# Patient Record
Sex: FEMALE
[De-identification: names, ages, dates, MRNs, and addresses within clinical notes are randomized per-mention and may not be internally consistent; named-entity substitution may affect disease eponyms.]

---

## 2020-08-26 ENCOUNTER — TELEPHONE (OUTPATIENT)
Dept: SCHEDULING | Age: 65
End: 2020-08-26

## 2022-02-23 ENCOUNTER — MED REC SCAN ONLY (OUTPATIENT)
Dept: PHYSICAL MEDICINE AND REHAB | Facility: CLINIC | Age: 67
End: 2022-02-23

## 2022-02-23 ENCOUNTER — OFFICE VISIT (OUTPATIENT)
Dept: PHYSICAL MEDICINE AND REHAB | Facility: CLINIC | Age: 67
End: 2022-02-23
Payer: MEDICARE

## 2022-02-23 VITALS — BODY MASS INDEX: 32.18 KG/M2 | HEIGHT: 67 IN | WEIGHT: 205 LBS

## 2022-02-23 DIAGNOSIS — R29.898 HAND WEAKNESS: ICD-10-CM

## 2022-02-23 DIAGNOSIS — M54.12 CERVICAL RADICULOPATHY: ICD-10-CM

## 2022-02-23 DIAGNOSIS — G89.29 CHRONIC BILATERAL LOW BACK PAIN WITH RIGHT-SIDED SCIATICA: Primary | ICD-10-CM

## 2022-02-23 DIAGNOSIS — M54.16 LUMBAR RADICULOPATHY: ICD-10-CM

## 2022-02-23 DIAGNOSIS — M43.16 SPONDYLOLISTHESIS OF LUMBAR REGION: ICD-10-CM

## 2022-02-23 DIAGNOSIS — M54.41 CHRONIC BILATERAL LOW BACK PAIN WITH RIGHT-SIDED SCIATICA: Primary | ICD-10-CM

## 2022-02-23 DIAGNOSIS — M51.9 LUMBAR DISC DISEASE: ICD-10-CM

## 2022-02-23 DIAGNOSIS — E11.42 DIABETIC POLYNEUROPATHY ASSOCIATED WITH TYPE 2 DIABETES MELLITUS (HCC): ICD-10-CM

## 2022-02-23 DIAGNOSIS — M48.062 SPINAL STENOSIS OF LUMBAR REGION WITH NEUROGENIC CLAUDICATION: ICD-10-CM

## 2022-02-23 DIAGNOSIS — M48.061 LUMBAR FORAMINAL STENOSIS: ICD-10-CM

## 2022-02-23 PROCEDURE — 99205 OFFICE O/P NEW HI 60 MIN: CPT | Performed by: PHYSICAL MEDICINE & REHABILITATION

## 2022-02-23 RX ORDER — GLYBURIDE 5 MG/1
TABLET ORAL
COMMUNITY
Start: 2010-01-01

## 2022-02-23 RX ORDER — HYDROCHLOROTHIAZIDE 25 MG/1
TABLET ORAL
COMMUNITY
Start: 2021-10-31

## 2022-02-23 RX ORDER — VALSARTAN 320 MG/1
TABLET ORAL
COMMUNITY
Start: 2010-01-01

## 2022-02-23 RX ORDER — EZETIMIBE 10 MG/1
TABLET ORAL
COMMUNITY
Start: 2015-01-05

## 2022-02-23 NOTE — PATIENT INSTRUCTIONS
Plan  I will do an EMG/NCS of the bilateral arms and hands. After she has had the above, I will decide if she need to have a cervical MRI scan or more PT. She will proceed with the medial branch blocks as per her decision. She will try PT with Mary Posey for 4 sessions. She will follow up in 2 months or sooner if needed. Dr. Dilshad Umanzor Dr. Trejo

## 2022-03-29 ENCOUNTER — PROCEDURE VISIT (OUTPATIENT)
Dept: PHYSICAL MEDICINE AND REHAB | Facility: CLINIC | Age: 67
End: 2022-03-29
Payer: MEDICARE

## 2022-03-29 DIAGNOSIS — M54.12 CERVICAL RADICULOPATHY: Primary | ICD-10-CM

## 2022-03-29 DIAGNOSIS — G56.03 BILATERAL CARPAL TUNNEL SYNDROME: ICD-10-CM

## 2022-03-29 DIAGNOSIS — G56.23 ULNAR NEUROPATHY OF BOTH UPPER EXTREMITIES: ICD-10-CM

## 2022-03-29 PROCEDURE — 95886 MUSC TEST DONE W/N TEST COMP: CPT | Performed by: PHYSICAL MEDICINE & REHABILITATION

## 2022-03-29 PROCEDURE — 95911 NRV CNDJ TEST 9-10 STUDIES: CPT | Performed by: PHYSICAL MEDICINE & REHABILITATION

## 2022-04-19 PROBLEM — G56.03 BILATERAL CARPAL TUNNEL SYNDROME: Status: ACTIVE | Noted: 2022-04-19

## 2022-04-19 PROBLEM — G56.23 ULNAR NEUROPATHY OF BOTH UPPER EXTREMITIES: Status: ACTIVE | Noted: 2022-04-19

## 2022-04-25 ENCOUNTER — OFFICE VISIT (OUTPATIENT)
Dept: PHYSICAL THERAPY | Facility: HOSPITAL | Age: 67
End: 2022-04-25
Attending: PHYSICAL MEDICINE & REHABILITATION
Payer: MEDICARE

## 2022-04-25 DIAGNOSIS — M54.16 LUMBAR RADICULOPATHY: ICD-10-CM

## 2022-04-25 DIAGNOSIS — E11.42 DIABETIC POLYNEUROPATHY ASSOCIATED WITH TYPE 2 DIABETES MELLITUS (HCC): ICD-10-CM

## 2022-04-25 DIAGNOSIS — G89.29 CHRONIC BILATERAL LOW BACK PAIN WITH RIGHT-SIDED SCIATICA: ICD-10-CM

## 2022-04-25 DIAGNOSIS — M48.062 SPINAL STENOSIS OF LUMBAR REGION WITH NEUROGENIC CLAUDICATION: ICD-10-CM

## 2022-04-25 DIAGNOSIS — M51.9 LUMBAR DISC DISEASE: ICD-10-CM

## 2022-04-25 DIAGNOSIS — M54.41 CHRONIC BILATERAL LOW BACK PAIN WITH RIGHT-SIDED SCIATICA: ICD-10-CM

## 2022-04-25 DIAGNOSIS — M48.061 LUMBAR FORAMINAL STENOSIS: ICD-10-CM

## 2022-04-25 DIAGNOSIS — M43.16 SPONDYLOLISTHESIS OF LUMBAR REGION: ICD-10-CM

## 2022-04-25 PROCEDURE — 97161 PT EVAL LOW COMPLEX 20 MIN: CPT | Performed by: PHYSICAL THERAPIST

## 2022-04-25 PROCEDURE — 97110 THERAPEUTIC EXERCISES: CPT | Performed by: PHYSICAL THERAPIST

## 2022-04-28 ENCOUNTER — OFFICE VISIT (OUTPATIENT)
Dept: PHYSICAL THERAPY | Facility: HOSPITAL | Age: 67
End: 2022-04-28
Attending: PHYSICAL MEDICINE & REHABILITATION
Payer: MEDICARE

## 2022-04-28 PROCEDURE — 97112 NEUROMUSCULAR REEDUCATION: CPT | Performed by: PHYSICAL THERAPIST

## 2022-05-02 ENCOUNTER — OFFICE VISIT (OUTPATIENT)
Dept: PHYSICAL THERAPY | Facility: HOSPITAL | Age: 67
End: 2022-05-02
Attending: PHYSICAL MEDICINE & REHABILITATION
Payer: MEDICARE

## 2022-05-02 PROCEDURE — 97112 NEUROMUSCULAR REEDUCATION: CPT | Performed by: PHYSICAL THERAPIST

## 2022-05-05 ENCOUNTER — APPOINTMENT (OUTPATIENT)
Dept: PHYSICAL THERAPY | Facility: HOSPITAL | Age: 67
End: 2022-05-05
Attending: PHYSICAL MEDICINE & REHABILITATION
Payer: MEDICARE

## 2022-05-09 ENCOUNTER — OFFICE VISIT (OUTPATIENT)
Dept: PHYSICAL MEDICINE AND REHAB | Facility: CLINIC | Age: 67
End: 2022-05-09
Payer: MEDICARE

## 2022-05-09 ENCOUNTER — OFFICE VISIT (OUTPATIENT)
Dept: PHYSICAL THERAPY | Facility: HOSPITAL | Age: 67
End: 2022-05-09
Attending: PHYSICAL MEDICINE & REHABILITATION
Payer: MEDICARE

## 2022-05-09 ENCOUNTER — TELEPHONE (OUTPATIENT)
Dept: NEUROLOGY | Facility: CLINIC | Age: 67
End: 2022-05-09

## 2022-05-09 VITALS
HEIGHT: 67 IN | DIASTOLIC BLOOD PRESSURE: 96 MMHG | SYSTOLIC BLOOD PRESSURE: 142 MMHG | OXYGEN SATURATION: 98 % | WEIGHT: 200 LBS | BODY MASS INDEX: 31.39 KG/M2 | HEART RATE: 71 BPM | RESPIRATION RATE: 16 BRPM

## 2022-05-09 DIAGNOSIS — G89.29 CHRONIC BILATERAL LOW BACK PAIN WITH RIGHT-SIDED SCIATICA: ICD-10-CM

## 2022-05-09 DIAGNOSIS — M54.16 LUMBAR RADICULOPATHY: ICD-10-CM

## 2022-05-09 DIAGNOSIS — M43.16 SPONDYLOLISTHESIS OF LUMBAR REGION: ICD-10-CM

## 2022-05-09 DIAGNOSIS — M51.9 LUMBAR DISC DISEASE: ICD-10-CM

## 2022-05-09 DIAGNOSIS — R29.898 HAND WEAKNESS: ICD-10-CM

## 2022-05-09 DIAGNOSIS — M54.41 CHRONIC BILATERAL LOW BACK PAIN WITH RIGHT-SIDED SCIATICA: ICD-10-CM

## 2022-05-09 DIAGNOSIS — M48.061 LUMBAR FORAMINAL STENOSIS: ICD-10-CM

## 2022-05-09 DIAGNOSIS — M48.062 SPINAL STENOSIS OF LUMBAR REGION WITH NEUROGENIC CLAUDICATION: ICD-10-CM

## 2022-05-09 DIAGNOSIS — E11.42 DIABETIC POLYNEUROPATHY ASSOCIATED WITH TYPE 2 DIABETES MELLITUS (HCC): ICD-10-CM

## 2022-05-09 DIAGNOSIS — M54.12 CERVICAL RADICULOPATHY: ICD-10-CM

## 2022-05-09 DIAGNOSIS — G56.03 BILATERAL CARPAL TUNNEL SYNDROME: ICD-10-CM

## 2022-05-09 DIAGNOSIS — G56.23 ULNAR NEUROPATHY OF BOTH UPPER EXTREMITIES: Primary | ICD-10-CM

## 2022-05-09 PROCEDURE — 97112 NEUROMUSCULAR REEDUCATION: CPT | Performed by: PHYSICAL THERAPIST

## 2022-05-09 PROCEDURE — 99214 OFFICE O/P EST MOD 30 MIN: CPT | Performed by: PHYSICAL MEDICINE & REHABILITATION

## 2022-05-09 RX ORDER — PIOGLITAZONEHYDROCHLORIDE 30 MG/1
30 TABLET ORAL DAILY
COMMUNITY
Start: 2022-04-26

## 2022-05-09 RX ORDER — BLOOD SUGAR DIAGNOSTIC
1 STRIP MISCELLANEOUS 2 TIMES DAILY
COMMUNITY
Start: 2022-02-28

## 2022-05-09 RX ORDER — AMOXICILLIN AND CLAVULANATE POTASSIUM 500; 125 MG/1; MG/1
TABLET, FILM COATED ORAL
COMMUNITY

## 2022-05-09 NOTE — PATIENT INSTRUCTIONS
Plan  She will get a MRI of the cervical spine. She will see Dorinda Melendez for the PT today. She will get get 2 surgical opinions about her lumbar spine. The patient does not need any pain medications at this time. She will follow up after seeing the spine surgeons and getting the cervical MRI scan. She will PT for the bilateral ulnar neuropathies.

## 2022-05-09 NOTE — TELEPHONE ENCOUNTER
Per Medicare guidelines authorization is not required for MRI SPINE CERVICAL (CPT=7241). L/m advising of above.

## 2022-06-03 ENCOUNTER — OFFICE VISIT (OUTPATIENT)
Dept: PHYSICAL THERAPY | Facility: HOSPITAL | Age: 67
End: 2022-06-03
Attending: PHYSICAL MEDICINE & REHABILITATION
Payer: MEDICARE

## 2022-06-03 PROCEDURE — 97112 NEUROMUSCULAR REEDUCATION: CPT | Performed by: PHYSICAL THERAPIST

## 2022-06-06 ENCOUNTER — HOSPITAL ENCOUNTER (OUTPATIENT)
Dept: MRI IMAGING | Facility: HOSPITAL | Age: 67
Discharge: HOME | End: 2022-06-06
Attending: PHYSICAL MEDICINE & REHABILITATION
Payer: MEDICARE

## 2022-06-06 DIAGNOSIS — M54.12 CERVICAL RADICULOPATHY: ICD-10-CM

## 2022-06-06 PROCEDURE — 72141 MRI NECK SPINE W/O DYE: CPT | Performed by: PHYSICAL MEDICINE & REHABILITATION

## 2022-06-07 ENCOUNTER — OFFICE VISIT (OUTPATIENT)
Dept: PHYSICAL THERAPY | Facility: HOSPITAL | Age: 67
End: 2022-06-07
Attending: PHYSICAL MEDICINE & REHABILITATION
Payer: MEDICARE

## 2022-06-07 PROCEDURE — 97112 NEUROMUSCULAR REEDUCATION: CPT | Performed by: PHYSICAL THERAPIST

## 2022-06-17 ENCOUNTER — OFFICE VISIT (OUTPATIENT)
Dept: PHYSICAL THERAPY | Facility: HOSPITAL | Age: 67
End: 2022-06-17
Attending: PHYSICAL MEDICINE & REHABILITATION
Payer: MEDICARE

## 2022-06-17 PROCEDURE — 97112 NEUROMUSCULAR REEDUCATION: CPT | Performed by: PHYSICAL THERAPIST

## 2022-06-23 ENCOUNTER — OFFICE VISIT (OUTPATIENT)
Dept: SURGERY | Facility: CLINIC | Age: 67
End: 2022-06-23
Payer: MEDICARE

## 2022-06-23 VITALS — DIASTOLIC BLOOD PRESSURE: 92 MMHG | SYSTOLIC BLOOD PRESSURE: 140 MMHG | HEART RATE: 80 BPM

## 2022-06-23 DIAGNOSIS — M43.16 SPONDYLOLISTHESIS AT L4-L5 LEVEL: ICD-10-CM

## 2022-06-23 DIAGNOSIS — M48.062 LUMBAR STENOSIS WITH NEUROGENIC CLAUDICATION: Primary | ICD-10-CM

## 2022-06-23 PROCEDURE — 99203 OFFICE O/P NEW LOW 30 MIN: CPT | Performed by: NEUROLOGICAL SURGERY

## 2022-06-23 RX ORDER — CLOTRIMAZOLE AND BETAMETHASONE DIPROPIONATE 10; .64 MG/G; MG/G
CREAM TOPICAL
COMMUNITY

## 2022-06-23 RX ORDER — AMOXICILLIN 500 MG/1
CAPSULE ORAL
COMMUNITY

## 2022-06-23 NOTE — PROGRESS NOTES
States she having lower back pain, states more on the right then the left, has n/t in the right leg stop at the knee. Had back pain for years but it started getting worst a year ago. States she went PT - its not helping her. She had the nerve burn on 4.2.22 states it helping her. Had 3 injections- the first on helped her but not the other turn. . when she doing things during the day she can get up to 10/10

## 2022-06-24 ENCOUNTER — OFFICE VISIT (OUTPATIENT)
Dept: PHYSICAL THERAPY | Facility: HOSPITAL | Age: 67
End: 2022-06-24
Attending: PHYSICAL MEDICINE & REHABILITATION
Payer: MEDICARE

## 2022-06-24 PROCEDURE — 97140 MANUAL THERAPY 1/> REGIONS: CPT | Performed by: PHYSICAL THERAPIST

## 2022-07-01 ENCOUNTER — OFFICE VISIT (OUTPATIENT)
Dept: PHYSICAL THERAPY | Facility: HOSPITAL | Age: 67
End: 2022-07-01
Attending: PHYSICAL MEDICINE & REHABILITATION
Payer: MEDICARE

## 2022-07-01 PROCEDURE — 97140 MANUAL THERAPY 1/> REGIONS: CPT | Performed by: PHYSICAL THERAPIST

## 2022-07-01 NOTE — PROGRESS NOTES
2022  Patient Name: Vika Ellis  YOB: 1955          MRN number:  [id number]  Referring Physician:  Dee Rodríguez      Discharge Summary    Dx:      Chronic bilateral low back pain with right-sided sciatica (M54.41,G89.29)  Spinal stenosis of lumbar region with neurogenic claudication (M48.062)  Lumbar disc disease (M51.9)  Lumbar foraminal stenosis (M48.061)  Spondylolisthesis of lumbar region (M43.16)  Diabetic polyneuropathy associated with type 2 diabetes mellitus (HCC) (E11.42)  Lumbar radiculopathy (M54.16)     Authorized # of Visits:  10 visits on POC          Next MD visit: none   Fall Risk: standard         Precautions:  spondylolisthesis at 2 levels  Medication Changes since last visit?: No    Subjective: Reports she is feeing good and ready for discharge. Overall symptoms have decreased and she can tolerate her workouts now. Also notes improved walking tolerance, less LE and UE pain. Has been doing her HEP daily and plans to have surgery on her lumbar spine in January.     Pain Ratin-3/10 VAS     Objective:     Trunk         Pain (+/-)   Flexion Hands to ankles                 Motion from the hip   Extension Limited 25%    R Sidebend Limited 25%    L Sidebend Limited 25%    R Rotation NT    L Rotation NT    R Sideglide WFL    L Sideglide WFL        MEHRDAD Testing R L   Adduction Drop Test - -   Extension Drop Test NT NT   SLR - -   Standing Reach Test - -   PART + -   PADT - -       STRENGTH:   5/5 MMT Scale   Left  Right Comments   Hip Flexion (L2) 5  5    Knee Extension (L3) 5 5    Knee Flexion 5 5    Ankle DF (L4) 5 5    EHL (L5) 5 5    Ankle PF (S1) 5 5    Hip Abduction NT NT    Hip Extension NT NT      Flexibility:      R L   Hamstrings long long   Piriformis short WFL   Hip Flexor short short   TFL short WFL     LE ROM:    R L   ER 45 45   IR 20 20     Neuro Screen:  (-) heel walking and toe walking  Special Tests: (-) SLR, (-) Slump       2022  Visit #4 6/3/2022  Visit #5 6/7/2022  Visit #6 6/24/2022  Visit #7 6/24/2022  Visit #8 7/1/2022  Visit #9   Manual Therapy     Brachial Chain Manual Techniques  1. L AIC  2. Sternal Technique  3.  R superior T4  4. R subclavious  5.  R intercostal release  6. L pec major      Manual cervical traction    STM bilateral UTs and cervical paraspinals    Neutral tension techniques bilateral UE's Brachial Chain Manual Techniques  1. L AIC  2. Sternal Technique  3.  R superior T4  4. R subclavious  5.  R intercostal release  6. L pec major      Manual cervical traction    STM bilateral UTs and cervical paraspinals    Neutral tension techniques bilateral UE's   Therapeutic Exercise Chin tucks    Cervical extension              Therapeutic Activity         Neuromuscular Education Neutral tension techniques bilateral UE\"s in sitting    Paraspinal release with L hamstring in sitting -without pushing on the UE's      Anterior neck inhibition in supine hooklying - reviewed    Adductor squeeze with exhale L glut push into the wall    L stance with R hip flexion    Standing IO/TA with wall and chair support    Reviewed breathing techniques SL Integration #47    Review of current HEP    Review of MEHRDAD principles    Modified gym program per [facility] principles      TNE Education         HEP Neutral tension techniques bilateral UE\"s in sitting    Paraspinal release with L hamstring in sitting -without pushing on the UE's      Anterior neck inhibition in supine hooklying - reviewed    Adductor squeeze with exhale L glut push into the wall    L stance with R hip flexion    Standing IO/TA with wall and chair support    Continue with previous HEP - HEP reviewed         Assessment:  Mrs. Owen Gómez has completed 9 visits of PT and has progressed well. She reports significantly less radicular symptoms in her LE's and no longer is experiencing radicular symptoms in her UE's.   She is able to walk community based distances and has returned to her gym program.  She is independent and compliant with her HEP and all goals have been met. Therefore DC PT with her HEP. The pt was advised to call with questions. The pt was educated on the plan of care, purpose and individual goals for therapy, precautions for therapy. All questions were answered. 1.  The pt will be independent in their HEP. MET 5/2/2022  2. Centralization of symptoms to the lumbar spine. MET 7/1/2022  3. The pt will be independent in a modified workout program.  MET 7/1/2022  4. The pt will be able to walk 3 miles without an increase in symptoms. MET 7/1/2022  5. The pt will report 50% decrease in symptoms. MET 7/1/2022    Plan:  DC PT at this time    Education or treatment limitation: None  Rehab Potential good      Certification From: 5/84/5262  To:7/24/2022        Charges: Man3     Total Timed Treatment: 45 min  Total Treatment Time: 45 min    Patient was advised of these findings, precautions, and treatment options and has agreed to actively participate in planning and for this course of care. Thank you for your referral. Please co-sign or sign and return this letter via fax as soon as possible to [phone]. If you have any questions, please contact me at Dept: [phone]. Sincerely,  Sylvia Carpenter PT    Electronically signed by therapist: Sylvia Carpenter PT    [de-identified] certification required: Yes  I certify the need for these services furnished under this plan of treatment and while under my care.     X___________________________________________________ Date____________________    Certification From: 7/8/8214  To:10/2/2022

## 2022-07-09 PROBLEM — M48.02 CERVICAL STENOSIS OF SPINE: Status: ACTIVE | Noted: 2022-07-09

## 2022-07-09 PROBLEM — M50.20 CERVICAL HERNIATED DISC: Status: ACTIVE | Noted: 2022-07-09

## 2022-07-09 PROBLEM — M50.90 CERVICAL DISC DISEASE: Status: ACTIVE | Noted: 2022-07-09

## 2022-07-11 ENCOUNTER — OFFICE VISIT (OUTPATIENT)
Dept: PHYSICAL MEDICINE AND REHAB | Facility: CLINIC | Age: 67
End: 2022-07-11
Payer: MEDICARE

## 2022-07-11 VITALS — HEART RATE: 72 BPM | WEIGHT: 200 LBS | OXYGEN SATURATION: 100 % | BODY MASS INDEX: 31 KG/M2

## 2022-07-11 DIAGNOSIS — F32.9 REACTIVE DEPRESSION: ICD-10-CM

## 2022-07-11 DIAGNOSIS — M48.02 CERVICAL STENOSIS OF SPINE: ICD-10-CM

## 2022-07-11 DIAGNOSIS — M50.90 CERVICAL DISC DISEASE: ICD-10-CM

## 2022-07-11 DIAGNOSIS — M48.062 SPINAL STENOSIS OF LUMBAR REGION WITH NEUROGENIC CLAUDICATION: ICD-10-CM

## 2022-07-11 DIAGNOSIS — M50.20 CERVICAL HERNIATED DISC: ICD-10-CM

## 2022-07-11 DIAGNOSIS — G56.23 ULNAR NEUROPATHY OF BOTH UPPER EXTREMITIES: ICD-10-CM

## 2022-07-11 DIAGNOSIS — M54.16 LUMBAR RADICULOPATHY: Primary | ICD-10-CM

## 2022-07-11 DIAGNOSIS — M48.061 LUMBAR FORAMINAL STENOSIS: ICD-10-CM

## 2022-07-11 DIAGNOSIS — M54.12 CERVICAL RADICULOPATHY: ICD-10-CM

## 2022-07-11 DIAGNOSIS — M43.16 SPONDYLOLISTHESIS OF LUMBAR REGION: ICD-10-CM

## 2022-07-11 DIAGNOSIS — M51.9 LUMBAR DISC DISEASE: ICD-10-CM

## 2022-07-11 DIAGNOSIS — E11.42 DIABETIC POLYNEUROPATHY ASSOCIATED WITH TYPE 2 DIABETES MELLITUS (HCC): ICD-10-CM

## 2022-07-11 DIAGNOSIS — G56.03 BILATERAL CARPAL TUNNEL SYNDROME: ICD-10-CM

## 2022-07-11 NOTE — PATIENT INSTRUCTIONS
Plan  The patient will continue with her home exercise program.    She will follow up with Dr. Elif Pedroza as planned and with the testing that he has recommended for her. She will follow up with me if she needs anything before she has the surgery. She will follow up as needed. The patient does not need any pain medications at this time.

## 2022-07-25 ENCOUNTER — TELEPHONE (OUTPATIENT)
Dept: PHYSICAL MEDICINE AND REHAB | Facility: CLINIC | Age: 67
End: 2022-07-25

## 2022-07-25 NOTE — TELEPHONE ENCOUNTER
----- Message from Syd Olmedo MD sent at 7/9/2022  8:29 PM CDT -----  She has significant bulging discs and spinal stenosis.   Please see how she is doing with the PT.

## 2022-10-24 ENCOUNTER — HOSPITAL ENCOUNTER (OUTPATIENT)
Dept: GENERAL RADIOLOGY | Facility: HOSPITAL | Age: 67
Discharge: HOME | End: 2022-10-24
Attending: NEUROLOGICAL SURGERY
Payer: MEDICARE

## 2022-10-24 ENCOUNTER — HOSPITAL ENCOUNTER (OUTPATIENT)
Dept: CT IMAGING | Facility: HOSPITAL | Age: 67
Discharge: HOME | End: 2022-10-24
Attending: NEUROLOGICAL SURGERY
Payer: MEDICARE

## 2022-10-24 ENCOUNTER — HOSPITAL ENCOUNTER (OUTPATIENT)
Dept: MRI IMAGING | Facility: HOSPITAL | Age: 67
Discharge: HOME | End: 2022-10-24
Attending: NEUROLOGICAL SURGERY
Payer: MEDICARE

## 2022-10-24 DIAGNOSIS — M43.16 SPONDYLOLISTHESIS AT L4-L5 LEVEL: ICD-10-CM

## 2022-10-24 DIAGNOSIS — M48.062 LUMBAR STENOSIS WITH NEUROGENIC CLAUDICATION: ICD-10-CM

## 2022-10-24 PROCEDURE — 72114 X-RAY EXAM L-S SPINE BENDING: CPT | Performed by: NEUROLOGICAL SURGERY

## 2022-10-24 PROCEDURE — 72148 MRI LUMBAR SPINE W/O DYE: CPT | Performed by: NEUROLOGICAL SURGERY

## 2022-10-24 PROCEDURE — 72131 CT LUMBAR SPINE W/O DYE: CPT | Performed by: NEUROLOGICAL SURGERY

## 2022-10-27 ENCOUNTER — TELEPHONE (OUTPATIENT)
Dept: SURGERY | Facility: CLINIC | Age: 67
End: 2022-10-27

## 2022-10-27 ENCOUNTER — OFFICE VISIT (OUTPATIENT)
Dept: SURGERY | Facility: CLINIC | Age: 67
End: 2022-10-27
Payer: MEDICARE

## 2022-10-27 VITALS
HEIGHT: 67 IN | OXYGEN SATURATION: 99 % | DIASTOLIC BLOOD PRESSURE: 70 MMHG | BODY MASS INDEX: 31.39 KG/M2 | HEART RATE: 71 BPM | WEIGHT: 200 LBS | SYSTOLIC BLOOD PRESSURE: 112 MMHG

## 2022-10-27 DIAGNOSIS — M43.16 SPONDYLOLISTHESIS AT L4-L5 LEVEL: ICD-10-CM

## 2022-10-27 DIAGNOSIS — M48.062 LUMBAR STENOSIS WITH NEUROGENIC CLAUDICATION: Primary | ICD-10-CM

## 2022-10-27 PROCEDURE — 99213 OFFICE O/P EST LOW 20 MIN: CPT | Performed by: NEUROLOGICAL SURGERY

## 2022-10-27 RX ORDER — NYSTATIN 100000 [USP'U]/G
POWDER TOPICAL
COMMUNITY
End: 2022-10-27

## 2022-10-27 NOTE — TELEPHONE ENCOUNTER
Patient is scheduled for L4-L5 laminectomy with medial facetectomies on 1.9.23 with Dr Jennifer Carrasquillo. Y Surgical instructions reviewed by surgery scheduler with patient  Y  form completed  Y Surgery order signed   Y Placed sx on surgery sheet  Y Placed on outlook calendar  Y [employer] message sent to patient with sx instructions  Y Faxed pre-op clearance request to PCP JANINE THAPA E-mail sent to [employer] [facility] Wang [facility] appointments made   Y PA MEDICARE. Routed to PA team to initiate. Y 3 day follow up reminder placed.    Y Entire Neurosurgery Checklist Completed

## 2022-11-30 RX ORDER — IBUPROFEN 200 MG
400 TABLET ORAL EVERY 6 HOURS PRN
COMMUNITY
End: 2023-01-09

## 2022-11-30 RX ORDER — MULTIVIT-MIN/IRON FUM/FOLIC AC 7.5 MG-4
1 TABLET ORAL DAILY
COMMUNITY

## 2022-11-30 RX ORDER — LORATADINE 10 MG/1
10 TABLET ORAL DAILY
COMMUNITY

## 2022-11-30 NOTE — TELEPHONE ENCOUNTER
Pt calling regarding pre op testing with PCP, pt states she has her instructions state to be seen 10-14 prior to surgery but this will be 3 weeks, she is also asking what other testing she needs prior to surgery.

## 2022-12-01 ENCOUNTER — TELEPHONE (OUTPATIENT)
Dept: NEUROLOGY | Facility: CLINIC | Age: 67
End: 2022-12-01

## 2022-12-01 NOTE — TELEPHONE ENCOUNTER
Jovanni Chowdhury returning call from the [facility] with pt Cardiologist     Lary Yeboah stated the cardiologist is requesting more information regarding pt upcoming surgery on 1/9/23.     - what type of sx pt will be undergoing.- LUMBAR 4 AND LUMBAR 5 LAMINECTOMIES WITH MEDIAL FACETECTOMIES AND ALL INDICATED PROCEDURES    -type of anesthesia - GENERAL    -how many hours - 2-3 HOURS     Angelica informed pt is on 1/2 of and aspirin daily. Informed the provider would like to request Asprin to be held 7-10 days prior to the procedure to prevent bleeding complications. Lary Yeboah stated that she will send the message and information to the cardiologist for review and feedback. Call was ended.     Please see TE on 12/1/22 for further information

## 2022-12-01 NOTE — TELEPHONE ENCOUNTER
Ronald Odell calling with [employer], stating she is needing more information for the cardiologist regarding pt's upcoming surgery.  Ronald Odell can be reached at [phone]

## 2022-12-01 NOTE — TELEPHONE ENCOUNTER
Noted messages listed below. Returned [employer] call with alternate phone number provided. No answer, LMTCB in regards to pt initials \"S. K\"

## 2022-12-01 NOTE — TELEPHONE ENCOUNTER
3 weeks before surgery is ok for her to see her PCP. Attempted to call pt. No answer, unable to leave message. Juan sent.

## 2022-12-01 NOTE — TELEPHONE ENCOUNTER
Arianne Brown returning call from the [facility] with pt Cardiologist     Leonel Gonzales stated the cardiologist is requesting more information regarding pt upcoming surgery on 1/9/23.     - what type of sx pt will be undergoing.- LUMBAR 4 AND LUMBAR 5 LAMINECTOMIES WITH MEDIAL FACETECTOMIES AND ALL INDICATED PROCEDURES    -type of anesthesia - GENERAL    -how many hours - 2-3 HOURS     Angelica informed pt is on 1/2 of and aspirin daily. Informed the provider would like to request Asprin to be held 7-10 days prior to the procedure to prevent bleeding complications. Leonel Gonzales stated that she will send the message and information to the cardiologist for review and feedback. Call was ended.     Nothing further needed for this encounter    See Schedule Sx TE for more information

## 2022-12-01 NOTE — TELEPHONE ENCOUNTER
Attempted to call the number provided; that phone number is not valid. Reviewed Care Team and was able to obtain alternate main # to cardiologist    [facility] Cardiology office and spoke to  who was not able to get a hold of nursing staff. Provided call back #. If they are inquiring on what is needed for clearance, refer to letters tab.   Letter with cardiac clearance request faxed to Dr. Milagro López.

## 2022-12-05 NOTE — TELEPHONE ENCOUNTER
Cards clearance received from Dr Dar Pierson, Ede Alegre are no cardiac contraindications to proceeding with necessary lumbar laminectomy and facetectomy with Dr Ledell Cheadle. Gerardo Marsh is aware of small risk of stent thrombosis given history of percutaneous coronary intervention, but prefers to hold Aspirin for 7-10 days prior to surgery.  Parker Jones from my standpoint for her to hold Aspirin as patient understands risk\"    Faxed to [facility]; sent to scanning

## 2022-12-05 NOTE — TELEPHONE ENCOUNTER
Rec'd clearance from [facility] group dated 12/1/22; endorse to RN for provider review

## 2023-01-06 ENCOUNTER — LABORATORY ENCOUNTER (OUTPATIENT)
Dept: LAB | Age: 68
End: 2023-01-06
Attending: NEUROLOGICAL SURGERY
Payer: MEDICARE

## 2023-01-06 ENCOUNTER — LAB ENCOUNTER (OUTPATIENT)
Dept: LAB | Age: 68
End: 2023-01-06
Attending: NEUROLOGICAL SURGERY
Payer: MEDICARE

## 2023-01-06 DIAGNOSIS — Z01.812 ENCOUNTER FOR PREPROCEDURE SCREENING LABORATORY TESTING FOR COVID-19: ICD-10-CM

## 2023-01-06 DIAGNOSIS — Z20.822 ENCOUNTER FOR PREPROCEDURE SCREENING LABORATORY TESTING FOR COVID-19: ICD-10-CM

## 2023-01-06 DIAGNOSIS — M48.062 LUMBAR STENOSIS WITH NEUROGENIC CLAUDICATION: ICD-10-CM

## 2023-01-06 LAB
ANTIBODY SCREEN: NEGATIVE
RH BLOOD TYPE: NEGATIVE

## 2023-01-06 PROCEDURE — 86850 RBC ANTIBODY SCREEN: CPT

## 2023-01-06 PROCEDURE — 36415 COLL VENOUS BLD VENIPUNCTURE: CPT

## 2023-01-06 PROCEDURE — 86901 BLOOD TYPING SEROLOGIC RH(D): CPT

## 2023-01-06 PROCEDURE — 86900 BLOOD TYPING SEROLOGIC ABO: CPT

## 2023-01-07 LAB — SARS-COV-2 RNA RESP QL NAA+PROBE: NOT DETECTED

## 2023-01-08 ENCOUNTER — ANESTHESIA EVENT (OUTPATIENT)
Dept: SURGERY | Facility: HOSPITAL | Age: 68
End: 2023-01-08
Payer: MEDICARE

## 2023-01-09 ENCOUNTER — ANESTHESIA (OUTPATIENT)
Dept: SURGERY | Facility: HOSPITAL | Age: 68
End: 2023-01-09
Payer: MEDICARE

## 2023-01-09 ENCOUNTER — HOSPITAL ENCOUNTER (OUTPATIENT)
Facility: HOSPITAL | Age: 68
Discharge: HOME | End: 2023-01-09
Attending: NEUROLOGICAL SURGERY | Admitting: NEUROLOGICAL SURGERY
Payer: MEDICARE

## 2023-01-09 ENCOUNTER — APPOINTMENT (OUTPATIENT)
Dept: GENERAL RADIOLOGY | Facility: HOSPITAL | Age: 68
End: 2023-01-09
Attending: NEUROLOGICAL SURGERY
Payer: MEDICARE

## 2023-01-09 VITALS
SYSTOLIC BLOOD PRESSURE: 133 MMHG | HEART RATE: 66 BPM | HEIGHT: 67 IN | TEMPERATURE: 98 F | WEIGHT: 212 LBS | RESPIRATION RATE: 15 BRPM | BODY MASS INDEX: 33.27 KG/M2 | DIASTOLIC BLOOD PRESSURE: 61 MMHG | OXYGEN SATURATION: 97 %

## 2023-01-09 DIAGNOSIS — Z20.822 ENCOUNTER FOR PREPROCEDURE SCREENING LABORATORY TESTING FOR COVID-19: ICD-10-CM

## 2023-01-09 DIAGNOSIS — Z01.812 ENCOUNTER FOR PREPROCEDURE SCREENING LABORATORY TESTING FOR COVID-19: ICD-10-CM

## 2023-01-09 DIAGNOSIS — M48.062 LUMBAR STENOSIS WITH NEUROGENIC CLAUDICATION: Primary | ICD-10-CM

## 2023-01-09 LAB
GLUCOSE BLD-MCNC: 137 MG/DL (ref 70–99)
GLUCOSE BLD-MCNC: 150 MG/DL (ref 70–99)

## 2023-01-09 PROCEDURE — 76000 FLUOROSCOPY <1 HR PHYS/QHP: CPT | Performed by: NEUROLOGICAL SURGERY

## 2023-01-09 PROCEDURE — 82962 GLUCOSE BLOOD TEST: CPT

## 2023-01-09 PROCEDURE — 00NY0ZZ RELEASE LUMBAR SPINAL CORD, OPEN APPROACH: ICD-10-PCS | Performed by: NEUROLOGICAL SURGERY

## 2023-01-09 RX ORDER — CEPHALEXIN 500 MG/1
500 CAPSULE ORAL 4 TIMES DAILY
Qty: 12 CAPSULE | Refills: 0 | Status: SHIPPED | OUTPATIENT
Start: 2023-01-09

## 2023-01-09 RX ORDER — LIDOCAINE HYDROCHLORIDE 10 MG/ML
INJECTION, SOLUTION EPIDURAL; INFILTRATION; INTRACAUDAL; PERINEURAL AS NEEDED
Status: DISCONTINUED | OUTPATIENT
Start: 2023-01-09 | End: 2023-01-09 | Stop reason: SURG

## 2023-01-09 RX ORDER — GLYBURIDE 5 MG/1
10 TABLET ORAL 2 TIMES DAILY WITH MEALS
COMMUNITY

## 2023-01-09 RX ORDER — SODIUM CHLORIDE, SODIUM LACTATE, POTASSIUM CHLORIDE, CALCIUM CHLORIDE 600; 310; 30; 20 MG/100ML; MG/100ML; MG/100ML; MG/100ML
INJECTION, SOLUTION INTRAVENOUS CONTINUOUS
Status: DISCONTINUED | OUTPATIENT
Start: 2023-01-09 | End: 2023-01-09

## 2023-01-09 RX ORDER — DEXTROSE MONOHYDRATE 25 G/50ML
50 INJECTION, SOLUTION INTRAVENOUS
Status: DISCONTINUED | OUTPATIENT
Start: 2023-01-09 | End: 2023-01-09 | Stop reason: HOSPADM

## 2023-01-09 RX ORDER — NICOTINE POLACRILEX 4 MG
15 LOZENGE BUCCAL
Status: DISCONTINUED | OUTPATIENT
Start: 2023-01-09 | End: 2023-01-09 | Stop reason: HOSPADM

## 2023-01-09 RX ORDER — INSULIN ASPART 100 [IU]/ML
INJECTION, SOLUTION INTRAVENOUS; SUBCUTANEOUS ONCE
Status: DISCONTINUED | OUTPATIENT
Start: 2023-01-09 | End: 2023-01-09

## 2023-01-09 RX ORDER — HYDROCODONE BITARTRATE AND ACETAMINOPHEN 5; 325 MG/1; MG/1
2 TABLET ORAL ONCE AS NEEDED
Status: DISCONTINUED | OUTPATIENT
Start: 2023-01-09 | End: 2023-01-09

## 2023-01-09 RX ORDER — DEXAMETHASONE SODIUM PHOSPHATE 4 MG/ML
VIAL (ML) INJECTION AS NEEDED
Status: DISCONTINUED | OUTPATIENT
Start: 2023-01-09 | End: 2023-01-09 | Stop reason: SURG

## 2023-01-09 RX ORDER — ONDANSETRON 2 MG/ML
INJECTION INTRAMUSCULAR; INTRAVENOUS
Status: COMPLETED
Start: 2023-01-09 | End: 2023-01-09

## 2023-01-09 RX ORDER — HYDROCODONE BITARTRATE AND ACETAMINOPHEN 10; 325 MG/1; MG/1
1-2 TABLET ORAL EVERY 4 HOURS PRN
Qty: 10 TABLET | Refills: 0 | Status: SHIPPED | OUTPATIENT
Start: 2023-01-09

## 2023-01-09 RX ORDER — ACETAMINOPHEN 500 MG
1000 TABLET ORAL ONCE
Status: DISCONTINUED | OUTPATIENT
Start: 2023-01-09 | End: 2023-01-09 | Stop reason: HOSPADM

## 2023-01-09 RX ORDER — ACETAMINOPHEN AND CODEINE PHOSPHATE 300; 30 MG/1; MG/1
1 TABLET ORAL EVERY 4 HOURS PRN
Qty: 30 TABLET | Refills: 0 | Status: SHIPPED | OUTPATIENT
Start: 2023-01-09

## 2023-01-09 RX ORDER — PHENYLEPHRINE HCL 10 MG/ML
VIAL (ML) INJECTION AS NEEDED
Status: DISCONTINUED | OUTPATIENT
Start: 2023-01-09 | End: 2023-01-09 | Stop reason: SURG

## 2023-01-09 RX ORDER — ACETAMINOPHEN 500 MG
1000 TABLET ORAL ONCE AS NEEDED
Status: DISCONTINUED | OUTPATIENT
Start: 2023-01-09 | End: 2023-01-09

## 2023-01-09 RX ORDER — ONDANSETRON 2 MG/ML
INJECTION INTRAMUSCULAR; INTRAVENOUS AS NEEDED
Status: DISCONTINUED | OUTPATIENT
Start: 2023-01-09 | End: 2023-01-09 | Stop reason: SURG

## 2023-01-09 RX ORDER — EPHEDRINE SULFATE 50 MG/ML
INJECTION INTRAVENOUS AS NEEDED
Status: DISCONTINUED | OUTPATIENT
Start: 2023-01-09 | End: 2023-01-09 | Stop reason: SURG

## 2023-01-09 RX ORDER — HYDROMORPHONE HYDROCHLORIDE 1 MG/ML
0.4 INJECTION, SOLUTION INTRAMUSCULAR; INTRAVENOUS; SUBCUTANEOUS EVERY 5 MIN PRN
Status: DISCONTINUED | OUTPATIENT
Start: 2023-01-09 | End: 2023-01-09

## 2023-01-09 RX ORDER — BUPIVACAINE HYDROCHLORIDE AND EPINEPHRINE 2.5; 5 MG/ML; UG/ML
INJECTION, SOLUTION EPIDURAL; INFILTRATION; INTRACAUDAL; PERINEURAL AS NEEDED
Status: DISCONTINUED | OUTPATIENT
Start: 2023-01-09 | End: 2023-01-09 | Stop reason: HOSPADM

## 2023-01-09 RX ORDER — CYCLOBENZAPRINE HCL 10 MG
10 TABLET ORAL 3 TIMES DAILY PRN
Qty: 30 TABLET | Refills: 0 | Status: SHIPPED | OUTPATIENT
Start: 2023-01-09

## 2023-01-09 RX ORDER — FAMOTIDINE 20 MG/1
20 TABLET, FILM COATED ORAL NIGHTLY PRN
COMMUNITY

## 2023-01-09 RX ORDER — ROCURONIUM BROMIDE 10 MG/ML
INJECTION, SOLUTION INTRAVENOUS AS NEEDED
Status: DISCONTINUED | OUTPATIENT
Start: 2023-01-09 | End: 2023-01-09 | Stop reason: SURG

## 2023-01-09 RX ORDER — CEFAZOLIN SODIUM/WATER 2 G/20 ML
2 SYRINGE (ML) INTRAVENOUS ONCE
Status: COMPLETED | OUTPATIENT
Start: 2023-01-09 | End: 2023-01-09

## 2023-01-09 RX ORDER — HYDROMORPHONE HYDROCHLORIDE 1 MG/ML
0.2 INJECTION, SOLUTION INTRAMUSCULAR; INTRAVENOUS; SUBCUTANEOUS EVERY 5 MIN PRN
Status: DISCONTINUED | OUTPATIENT
Start: 2023-01-09 | End: 2023-01-09

## 2023-01-09 RX ORDER — METOCLOPRAMIDE HYDROCHLORIDE 5 MG/ML
10 INJECTION INTRAMUSCULAR; INTRAVENOUS EVERY 8 HOURS PRN
Status: DISCONTINUED | OUTPATIENT
Start: 2023-01-09 | End: 2023-01-09

## 2023-01-09 RX ORDER — NALOXONE HYDROCHLORIDE 0.4 MG/ML
80 INJECTION, SOLUTION INTRAMUSCULAR; INTRAVENOUS; SUBCUTANEOUS AS NEEDED
Status: DISCONTINUED | OUTPATIENT
Start: 2023-01-09 | End: 2023-01-09

## 2023-01-09 RX ORDER — NICOTINE POLACRILEX 4 MG
30 LOZENGE BUCCAL
Status: DISCONTINUED | OUTPATIENT
Start: 2023-01-09 | End: 2023-01-09 | Stop reason: HOSPADM

## 2023-01-09 RX ORDER — ONDANSETRON 2 MG/ML
4 INJECTION INTRAMUSCULAR; INTRAVENOUS EVERY 6 HOURS PRN
Status: DISCONTINUED | OUTPATIENT
Start: 2023-01-09 | End: 2023-01-09

## 2023-01-09 RX ORDER — HYDROCODONE BITARTRATE AND ACETAMINOPHEN 5; 325 MG/1; MG/1
1 TABLET ORAL ONCE AS NEEDED
Status: DISCONTINUED | OUTPATIENT
Start: 2023-01-09 | End: 2023-01-09

## 2023-01-09 RX ORDER — HYDROMORPHONE HYDROCHLORIDE 1 MG/ML
0.6 INJECTION, SOLUTION INTRAMUSCULAR; INTRAVENOUS; SUBCUTANEOUS EVERY 5 MIN PRN
Status: DISCONTINUED | OUTPATIENT
Start: 2023-01-09 | End: 2023-01-09

## 2023-01-09 RX ADMIN — CEFAZOLIN SODIUM/WATER 2 G: 2 G/20 ML SYRINGE (ML) INTRAVENOUS at 07:50:00

## 2023-01-09 RX ADMIN — SODIUM CHLORIDE, SODIUM LACTATE, POTASSIUM CHLORIDE, CALCIUM CHLORIDE: 600; 310; 30; 20 INJECTION, SOLUTION INTRAVENOUS at 07:30:00

## 2023-01-09 RX ADMIN — EPHEDRINE SULFATE 10 MG: 50 INJECTION INTRAVENOUS at 08:49:00

## 2023-01-09 RX ADMIN — PHENYLEPHRINE HCL 100 MCG: 10 MG/ML VIAL (ML) INJECTION at 08:55:00

## 2023-01-09 RX ADMIN — ROCURONIUM BROMIDE 50 MG: 10 INJECTION, SOLUTION INTRAVENOUS at 07:33:00

## 2023-01-09 RX ADMIN — EPHEDRINE SULFATE 10 MG: 50 INJECTION INTRAVENOUS at 08:22:00

## 2023-01-09 RX ADMIN — LIDOCAINE HYDROCHLORIDE 50 MG: 10 INJECTION, SOLUTION EPIDURAL; INFILTRATION; INTRACAUDAL; PERINEURAL at 07:33:00

## 2023-01-09 RX ADMIN — ROCURONIUM BROMIDE 10 MG: 10 INJECTION, SOLUTION INTRAVENOUS at 08:06:00

## 2023-01-09 RX ADMIN — ONDANSETRON 4 MG: 2 INJECTION INTRAMUSCULAR; INTRAVENOUS at 09:11:00

## 2023-01-09 RX ADMIN — EPHEDRINE SULFATE 10 MG: 50 INJECTION INTRAVENOUS at 08:24:00

## 2023-01-09 RX ADMIN — DEXAMETHASONE SODIUM PHOSPHATE 4 MG: 4 MG/ML VIAL (ML) INJECTION at 07:33:00

## 2023-01-09 NOTE — OPERATIVE REPORT
Operative Note    Patient Name: Sergio Garcia    Preoperative Diagnosis: Lumbar stenosis with neurogenic claudication [M48.062]  Spondylolisthesis at L4-L5 level [M43.16]    Postoperative Diagnosis: same    Primary Surgeon: Dimple Sanabria MD    Assistant: Elizabeth Bill PA-C, was essential to case including opening, closing and retraction    Procedures: Lumbar 4 lumbar 5 laminectomies with medial facetectomies    Surgical Findings: Severe stenosis L4-5    Anesthesia: General    Complications: none    Implants: None    Specimen: None    Drains: None    Condition: Stable    Estimated Blood Loss: 10 mL    Indication for Procedure: 38-year-old female with lumbar stenosis and neurogenic claudication. Patient unable to walk further distances. Patient seen in clinic. Patient failed nonoperative treatments. After discussion patient wished to proceed with surgical decompression. Procedure: Patient properly identified and brought back to the OR 16. Patient placed under general anesthesia by anesthesia staff. Patient placed on the OR table, positioned and all pressure points padded. Fluoroscopy shot to confirm her levels. Patient was sterilely prepped and draped in usual fashion. 10 mils of quarter percent Marcaine with epinephrine was given as local anesthetic. Incision was made dissection down to fascia. A subperiosteal dissection then was performed along L4 and L5 bilaterally. Again fluoroscopy confirmed our levels. The spinous process of L4 and L5 were removed with rongeurs. The inferior lamina of L5 was delineated with a curette. We then proceeded to laminectomies and medial facetectomies using Kerrisons and curettes. Once the laminectomy was completed and the medial facetectomy was complete the dura was nicely decompressed and fully pulsatile. A probe confirmed the gutters were decompressed on both sides. Hemostasis was achieved. The wound was then closed in layers.   Patient was taken off the OR table. Patient awakened by anesthesia. Patient taken recovery.     Dictated not proofread

## 2023-01-09 NOTE — ANESTHESIA PROCEDURE NOTES
Airway  Date/Time: 1/9/2023 7:40 AM  Urgency: elective      General Information and Staff    Patient location during procedure: OR  Anesthesiologist: Shukri Ambrocio MD  Performed: anesthesiologist     Indications and Patient Condition  Indications for airway management: anesthesia  Sedation level: deep  Preoxygenated: yes  Patient position: sniffing  Mask difficulty assessment: 1 - vent by mask    Final Airway Details  Final airway type: endotracheal airway      Successful airway: ETT  Cuffed: yes   Successful intubation technique: direct laryngoscopy  Endotracheal tube insertion site: oral  Blade: GlideScope  Blade size: #3  ETT size (mm): 7.0    Cormack-Lehane Classification: grade I - full view of glottis  Placement verified by: chest auscultation and capnometry   Measured from: lips  ETT to lips (cm): 21  Number of attempts at approach: 1    Additional Comments  Atraumatic  Ett easily placed  Neck neutral during placement

## 2023-01-09 NOTE — BRIEF OP NOTE
Pre-Operative Diagnosis: Lumbar stenosis with neurogenic claudication [M48.062]  Spondylolisthesis at L4-L5 level [M43.16]     Post-Operative Diagnosis: Lumbar stenosis with neurogenic claudication [M48.062]Spondylolisthesis at L4-L5 level [M43.16]      Procedure Performed:   LUMBAR 4 AND LUMBAR 5 LAMINECTOMIES WITH MEDIAL FACETECTOMIES     Surgeon(s) and Role:     * Sakshi Louise MD - Primary    Assistant(s):  PA: CLEO Trotter     Surgical Findings: See dictation     Specimen: None     Estimated Blood Loss: Blood Output: 10 mL (1/9/2023  9:08 AM)        Hernan Tinajero MD  1/9/2023  9:32 AM

## 2023-01-12 ENCOUNTER — TELEPHONE (OUTPATIENT)
Dept: SURGERY | Facility: CLINIC | Age: 68
End: 2023-01-12

## 2023-01-12 NOTE — TELEPHONE ENCOUNTER
Received message from [facility] that patient has called with a condition update. Patient underwent surgery with Dr. Smita Reid on 1/9/23:  LUMBAR 4 AND LUMBAR 5 LAMINECTOMIES WITH MEDIAL FACETECTOMIES    For Lumbar Sx:    Post op day 3    1)Asked how Braden Ceja is feeling in general she/he stated: \"I feel fine, but my leg is killing me\". 2) Discussed Dressing with patient, informed them dressing can be removed on day 3, patient acknowledged. 3)Site check for redness, drainage, swelling, discoloration, fever, etc.  Patient states: site was checked by , it \"looks good\". Patient stated that she had a low grade 99 degree fever for 2 days which has since subsided. 4) Discussed Current Pain Level:  Pain currently 8-9 /10 when rising from seated to standing position in right hip radiating to right leg, behind the knee. If patient is struggling with pain control, discuss recommendations to help with pain. Discussed using ice/heat, patient states heat alleviates pain while she is resting. She stated she has held [address], as it has caused dizziness in the past, she takes 0.5 Tylenol with Codeine and muscle relaxer. Discussed that patient may take muscle relaxer TID, patient acknowledged. 5) Discussed Symptom improvement with patient they stated: The cramping to the right leg is her main concern. 6) Discussed medication and asked if he/she currently needs any refills, Pt stated: No refills requested. 7) Confirmed post-op appointments with patient as listed above. Inquired if patient would like to come into clinic for an earlier appointment to see a provider, and patient declined.       Provider Deanne Padilla   1/24/2023 1:15 PM Kori Prince PA-C [address] [facility] [place]   2/21/2023 11:00 AM Mynor Louise MD [facility], [address] [place] [address] [facility] [place]     8) Discussed Surgical Restrictions with patient:  No baths/pool/hot tub  No bending, lifting, twisting at least until 2 week follow up  Patient acknowledged. 9)Verified if pt had any other issues they needed to discuss. Nothing to report. 10)Provided update on FMLA (received, initiated, need signature, completed etc). Routed to providers for update.

## 2023-01-12 NOTE — TELEPHONE ENCOUNTER
Pt is in severe pain having pain down rt leg states tendon feeling super tight hurts more when pt stands up if pt is sitting down feels ok wondering if she should take more the muscle spasm medication please advise

## 2023-01-13 RX ORDER — METHYLPREDNISOLONE 4 MG/1
TABLET ORAL
Qty: 1 EACH | Refills: 0 | Status: SHIPPED | OUTPATIENT
Start: 2023-01-13

## 2023-01-13 NOTE — TELEPHONE ENCOUNTER
Called patient  Having pain in back of right leg, same location as pre op, but worse  Leg working fine  Better today  Will try medrol dose pack  Patient appreciative

## 2023-01-24 ENCOUNTER — OFFICE VISIT (OUTPATIENT)
Dept: SURGERY | Facility: CLINIC | Age: 68
End: 2023-01-24
Payer: MEDICARE

## 2023-01-24 VITALS
BODY MASS INDEX: 32.18 KG/M2 | HEART RATE: 78 BPM | HEIGHT: 67 IN | WEIGHT: 205 LBS | DIASTOLIC BLOOD PRESSURE: 74 MMHG | SYSTOLIC BLOOD PRESSURE: 122 MMHG

## 2023-01-24 DIAGNOSIS — Z98.890 POST-OPERATIVE STATE: ICD-10-CM

## 2023-01-24 DIAGNOSIS — Z98.890 S/P LAMINECTOMY: Primary | ICD-10-CM

## 2023-01-24 PROCEDURE — 99024 POSTOP FOLLOW-UP VISIT: CPT | Performed by: PHYSICIAN ASSISTANT

## 2023-01-24 NOTE — PROGRESS NOTES
Established patient:  Reason for follow up:   2 week post op   LUMBAR 4 AND LUMBAR 5 LAMINECTOMIES WITH MEDIAL FACETECTOMIES    Numeric Rating Scale:         Pain at Present:  1/10       Distribution of Pain:    Most recent treatments for Current Pain Condition:   Surgery  Response to treatment: complete resolution    New imaging or testing since your last office visit:

## 2023-01-27 ENCOUNTER — OFFICE VISIT (OUTPATIENT)
Dept: PHYSICAL THERAPY | Facility: HOSPITAL | Age: 68
End: 2023-01-27
Attending: PHYSICIAN ASSISTANT
Payer: MEDICARE

## 2023-01-27 DIAGNOSIS — Z98.890 S/P LAMINECTOMY: ICD-10-CM

## 2023-01-27 DIAGNOSIS — Z98.890 POST-OPERATIVE STATE: ICD-10-CM

## 2023-01-27 PROCEDURE — 97161 PT EVAL LOW COMPLEX 20 MIN: CPT | Performed by: PHYSICAL THERAPIST

## 2023-01-27 PROCEDURE — 97110 THERAPEUTIC EXERCISES: CPT | Performed by: PHYSICAL THERAPIST

## 2023-01-30 ENCOUNTER — TELEPHONE (OUTPATIENT)
Dept: SURGERY | Facility: CLINIC | Age: 68
End: 2023-01-30

## 2023-01-30 NOTE — TELEPHONE ENCOUNTER
Pt would like Drs recommendation if he thinks it okay for pt to travel to daughters home in [facility] after having sx 1/9 flight is 8 hours pt would be leaving sometime in April please advise

## 2023-01-31 NOTE — TELEPHONE ENCOUNTER
Noted the providers feedback listed below. Noted the patient is active on mychart. Notified pt of providers feedback listed below via mc message.

## 2023-02-02 ENCOUNTER — APPOINTMENT (OUTPATIENT)
Dept: PHYSICAL THERAPY | Facility: HOSPITAL | Age: 68
End: 2023-02-02
Attending: PHYSICIAN ASSISTANT
Payer: MEDICARE

## 2023-02-06 ENCOUNTER — TELEPHONE (OUTPATIENT)
Dept: PHYSICAL THERAPY | Facility: HOSPITAL | Age: 68
End: 2023-02-06

## 2023-02-09 ENCOUNTER — APPOINTMENT (OUTPATIENT)
Dept: PHYSICAL THERAPY | Facility: HOSPITAL | Age: 68
End: 2023-02-09
Attending: PHYSICIAN ASSISTANT
Payer: MEDICARE

## 2023-02-09 ENCOUNTER — OFFICE VISIT (OUTPATIENT)
Dept: PHYSICAL THERAPY | Facility: HOSPITAL | Age: 68
End: 2023-02-09
Attending: NEUROLOGICAL SURGERY
Payer: MEDICARE

## 2023-02-09 PROCEDURE — 97140 MANUAL THERAPY 1/> REGIONS: CPT | Performed by: PHYSICAL THERAPIST

## 2023-02-09 PROCEDURE — 97110 THERAPEUTIC EXERCISES: CPT | Performed by: PHYSICAL THERAPIST

## 2023-02-09 PROCEDURE — 97112 NEUROMUSCULAR REEDUCATION: CPT | Performed by: PHYSICAL THERAPIST

## 2023-02-10 ENCOUNTER — APPOINTMENT (OUTPATIENT)
Dept: PHYSICAL THERAPY | Facility: HOSPITAL | Age: 68
End: 2023-02-10
Attending: NEUROLOGICAL SURGERY
Payer: MEDICARE

## 2023-02-17 ENCOUNTER — OFFICE VISIT (OUTPATIENT)
Dept: PHYSICAL THERAPY | Facility: HOSPITAL | Age: 68
End: 2023-02-17
Attending: PHYSICIAN ASSISTANT
Payer: MEDICARE

## 2023-02-17 PROCEDURE — 97140 MANUAL THERAPY 1/> REGIONS: CPT | Performed by: PHYSICAL THERAPIST

## 2023-02-17 PROCEDURE — 97110 THERAPEUTIC EXERCISES: CPT | Performed by: PHYSICAL THERAPIST

## 2023-02-17 PROCEDURE — 97112 NEUROMUSCULAR REEDUCATION: CPT | Performed by: PHYSICAL THERAPIST

## 2023-02-17 NOTE — PROGRESS NOTES
2023  Patient Name: Rodrick Wharton  YOB: 1955          MRN number:  [id number]  Referring Physician:  CLEO Johnson/Dr. Barbra Moyer    Progress Summary    Dx:      Status post 2023: Dr. Barbra Moyer: L4 and L5 laminectomies with medial facetectomies      Authorized # of Visits:  10 visits on POC          Next MD visit: none   Fall Risk: standard         Precautions:  S/p lumbar spine surgery - post surgical precautions  Medication Changes since last visit?: No    Subjective: States she gets sore in the back but feels like it might be scar tissue. States her foot is still very sore but reports she only has pain when she is on her feet with her shoes on. Able to do the bike without pain. Also tried the elliptical. Tape on the food helps. .  Reports she is 90% improve since surgery. Reports she still feels some weakness in her R LE. Pain Ratin-2/10 VAS soreness in her her back, 7/10 VAS foot    Objective:     STRENGTH:   5/5 MMT Scale EVAL/2023   Left  Right Comments   Hip Flexion (L2) 5/5  4+/5    Knee Extension (L3) 4+/5 4+/5    Knee Flexion 5/5 5/5    Ankle DF (L4) 5/5 4+/5    EHL (L5) 5/5 4/5    Ankle PF (S1) 5/5 4/5    Hip Abduction NT NT    Hip Extension NT NT           2023  Visit #  1  2023  Visit #2 2023  Visit #3   Manual Therapy  Ankle joint mobs    STM R heel STM bilateral QL's and incisional area   Therapeutic Exercise Educated in centralization of symptoms and precautions for therapy    Education to avoid  1. BLT  2.   Over-fatiguing the LE's    DF/PF in sitting    SAQ    LAQ     Education on appropriate gym workouts   Adductor squeeze with exhale    Wall planks    Grape ant activity Hamstring isometric    SLS    DF/PF in sitting     Bosu ball balance   Therapeutic Activity      Neuromuscular Education  Neutral tension techniques    kineiotaping R heel kineiotaping R heel    Neutral tension techniques   TNE Education      HEP DF/PF in sitting    SAQ    LAQ     Hamstring isometric    SLS    DF/PF in sitting     Bosu ball balance       Assessment: Debi Copeland has completed 3 visits of PT and has progressed very well. She is now walking at the track and recently started the elliptical. She displays significantly improve strength in her R LE and no longer had radicular symptoms. She does c/o increased R heel pain after her stride length normalized while walking. She is independent and compliant with her HEP and is progressing well towards her remaining goals. Recommend skilled PT 1 times per week for up to 8 visits. The pt is in agreement with the POC. Goals: The pt was educated on the plan of care, purpose and individual goals for therapy, precautions for therapy. All questions were answered. 1.  The pt will be independent in their HEP. MET 2/17/2023  2. Centralization of symptoms to the lumbar spine. PROGRESSING 2/17/2023  3. The pt will be independent in a modified workout program.  PROGRESSING 2/17/2023  4. The pt will report 75% improvement overall. MET 2/17/2023  5. The pt will be able to walk 2 miles without an increase in pain. MET 2/17/2023  6. The pt will display probably body mechanics when bending over to pick an item off the floor. MET 2/17/2023    Frequency / Duration: Patient will be seen for 1-2 x/week or a total of 8 visits over a 90 day period. Treatment will include: Manual Therapy; Therapeutic Exercises; Neuromuscular Re-education; Therapeutic Activity; Patient education: Home exercise program instruction; TNE Education; Modalities as needed. Education or treatment limitation: None  Rehab Potential good    Charges: Man1, TE1, NM1    Total Timed Treatment: 45 min  Total Treatment Time: 45 min    Patient was advised of these findings, precautions, and treatment options and has agreed to actively participate in planning and for this course of care.     Thank you for your referral. Please co-sign or sign and return this letter via fax as soon as possible to [phone]. If you have any questions, please contact me at Dept: [phone]. Sincerely,  Rosemary Oliveira PT    Electronically signed by therapist: Rosemary Oliveira PT    [de-identified] certification required: Yes  I certify the need for these services furnished under this plan of treatment and while under my care.     X___________________________________________________ Date____________________    Certification From: 0/35/3241  To:5/20/2023

## 2023-02-20 ENCOUNTER — APPOINTMENT (OUTPATIENT)
Dept: PHYSICAL THERAPY | Facility: HOSPITAL | Age: 68
End: 2023-02-20
Attending: PHYSICIAN ASSISTANT
Payer: MEDICARE

## 2023-02-21 ENCOUNTER — OFFICE VISIT (OUTPATIENT)
Dept: SURGERY | Facility: CLINIC | Age: 68
End: 2023-02-21
Payer: MEDICARE

## 2023-02-21 VITALS
SYSTOLIC BLOOD PRESSURE: 134 MMHG | WEIGHT: 205 LBS | BODY MASS INDEX: 32.18 KG/M2 | HEIGHT: 67 IN | DIASTOLIC BLOOD PRESSURE: 76 MMHG | HEART RATE: 73 BPM

## 2023-02-21 DIAGNOSIS — M48.062 LUMBAR STENOSIS WITH NEUROGENIC CLAUDICATION: Primary | ICD-10-CM

## 2023-02-21 PROCEDURE — 99024 POSTOP FOLLOW-UP VISIT: CPT | Performed by: NEUROLOGICAL SURGERY

## 2023-02-21 RX ORDER — METHYLPREDNISOLONE 4 MG/1
TABLET ORAL
Qty: 1 EACH | Refills: 0 | Status: SHIPPED | OUTPATIENT
Start: 2023-02-21

## 2023-02-21 NOTE — PROGRESS NOTES
Established patient:  Reason for follow up:   6 week post op, sx 01/09/23  LUMBAR 4 AND LUMBAR 5 LAMINECTOMIES WITH MEDIAL FACETECTOMIES    Numeric Rating Scale:   Pain at Present:  0/10       Most recent treatments for Current Pain Condition:   Physical Therapy and Surgery

## 2023-02-22 ENCOUNTER — APPOINTMENT (OUTPATIENT)
Dept: PHYSICAL THERAPY | Facility: HOSPITAL | Age: 68
End: 2023-02-22
Attending: PHYSICIAN ASSISTANT
Payer: MEDICARE

## 2023-02-27 ENCOUNTER — OFFICE VISIT (OUTPATIENT)
Dept: PHYSICAL THERAPY | Facility: HOSPITAL | Age: 68
End: 2023-02-27
Attending: PHYSICIAN ASSISTANT
Payer: MEDICARE

## 2023-02-27 PROCEDURE — 97140 MANUAL THERAPY 1/> REGIONS: CPT | Performed by: PHYSICAL THERAPIST

## 2023-02-27 PROCEDURE — 97110 THERAPEUTIC EXERCISES: CPT | Performed by: PHYSICAL THERAPIST

## 2023-02-27 NOTE — PROGRESS NOTES
2023  Patient Name: Dee Torres  YOB: 1955          MRN number:  [id number]  Referring Physician:  CLEO Barriga/Dr. Homer Wilson    Dx:      Status post 2023: Dr. Homer Wilson: L4 and L5 laminectomies with medial facetectomies      Authorized # of Visits:  10 visits on POC          Next MD visit: none   Fall Risk: standard         Precautions:  S/p lumbar spine surgery - post surgical precautions  Medication Changes since last visit?: No    Subjective: States she got new shoes  ([employer]) and now orthotics. States her back is doing ok. States she still is sore in the LB. Doing all her HEP. States she saw the surgeon last week who released her. Pain Ratin-2/10 VAS soreness in her her back    Objective:     STRENGTH:   5/5 MMT Scale EVAL/2023   Left  Right Comments   Hip Flexion (L2) 5/5  4+/5    Knee Extension (L3) 4+/5 4+/5    Knee Flexion 5/5 5/5    Ankle DF (L4) 5/5 4+/5    EHL (L5) 5/5 4/5    Ankle PF (S1) 5/5 4/5    Hip Abduction NT NT    Hip Extension NT NT           2023  Visit #3 2023  Visit #4   Manual Therapy STM bilateral QL's and incisional area STM bilateral QL's and incisional area   Therapeutic Exercise Hamstring isometric    SLS    DF/PF in sitting     Bosu ball balance Abdominal isometrics  1. Center  2. Diagonals    Adductor squeeze with machine    Leg press    Therapeutic Activity     Neuromuscular Education kineiotaping R heel    Neutral tension techniques    TNE Education     HEP Hamstring isometric    SLS    DF/PF in sitting     Bosu ball balance Abdominal isometrics  1. Center  2. Diagonals    Adductor squeeze with machine    Leg press        Assessment: Milton Mendiola has completed 3 visits of PT and has progressed very well. She is now walking at the track and recently started the elliptical. She displays significantly improve strength in her R LE and no longer had radicular symptoms.  She does c/o increased R heel pain after her stride length normalized while walking. She is independent and compliant with her HEP and is progressing well towards her remaining goals. Recommend skilled PT 1 times per week for up to 8 visits. The pt is in agreement with the POC. Goals: The pt was educated on the plan of care, purpose and individual goals for therapy, precautions for therapy. All questions were answered. 1.  The pt will be independent in their HEP. MET 2/17/2023  2. Centralization of symptoms to the lumbar spine. PROGRESSING 2/17/2023  3. The pt will be independent in a modified workout program.  PROGRESSING 2/17/2023  4. The pt will report 75% improvement overall. MET 2/17/2023  5. The pt will be able to walk 2 miles without an increase in pain. MET 2/17/2023  6. The pt will display probably body mechanics when bending over to pick an item off the floor. MET 2/17/2023    Frequency / Duration: Patient will be seen for 1-2 x/week or a total of 8 visits over a 90 day period. Treatment will include: Manual Therapy; Therapeutic Exercises; Neuromuscular Re-education; Therapeutic Activity; Patient education: Home exercise program instruction; TNE Education; Modalities as needed. Education or treatment limitation: None  Rehab Potential good    Charges: Man2, TE1   Total Timed Treatment: 45 min  Total Treatment Time: 45 min    Patient was advised of these findings, precautions, and treatment options and has agreed to actively participate in planning and for this course of care. Thank you for your referral. Please co-sign or sign and return this letter via fax as soon as possible to [phone]. If you have any questions, please contact me at Dept: [phone].     Sincerely,  Janice Petty PT    Electronically signed by therapist: Janice Petty PT    [de-identified] certification required: Yes  I certify the need for these services furnished under this plan of treatment and while under my care.    X___________________________________________________ Date____________________    Certification From: 5/50/3355  To:5/20/2023

## 2023-03-02 ENCOUNTER — OFFICE VISIT (OUTPATIENT)
Dept: PHYSICAL THERAPY | Facility: HOSPITAL | Age: 68
End: 2023-03-02
Attending: PHYSICIAN ASSISTANT
Payer: MEDICARE

## 2023-03-02 PROCEDURE — 97140 MANUAL THERAPY 1/> REGIONS: CPT | Performed by: PHYSICAL THERAPIST

## 2023-03-02 PROCEDURE — 97110 THERAPEUTIC EXERCISES: CPT | Performed by: PHYSICAL THERAPIST

## 2023-03-02 NOTE — PROGRESS NOTES
3/2/2023  Patient Name: Caden Nath  YOB: 1955          MRN number:  [id number]  Referring Physician:  CLEO Wilburn/Dr. Nicho Rodriguez    Dx:      Status post 2023: Dr. Nicho Rodriguez: L4 and L5 laminectomies with medial facetectomies      Authorized # of Visits:  10 visits on POC          Next MD visit: none   Fall Risk: standard         Precautions:  S/p lumbar spine surgery - post surgical precautions  Medication Changes since last visit?: No    Subjective: States her R heel still bothers her. States she still gets pain in the back of her R heel. States she got new [employer] shoes but doesn't think they make much different. Pain Ratin-2/10 VAS soreness in her her back    Objective:     STRENGTH:   5/5 MMT Scale EVAL/2023   Left  Right Comments   Hip Flexion (L2) 5/5  4+/5    Knee Extension (L3) 4+/5 4+/5    Knee Flexion 5/5 5/5    Ankle DF (L4) 5/5 4+/5    EHL (L5) 5/5 4/5    Ankle PF (S1) 5/5 4/5    Hip Abduction NT NT    Hip Extension NT NT           2023  Visit #3 2023  Visit #4 3/2/2023  Visit #5   Manual Therapy STM bilateral QL's and incisional area STM bilateral QL's and incisional area STM bilateral QL's and incisional area/R piriformis area    Neutral flossing R LE    Ankle joint mobs R       Therapeutic Exercise Hamstring isometric    SLS    DF/PF in sitting     Bosu ball balance Abdominal isometrics  1. Center  2. Diagonals    Adductor squeeze with machine    Leg press  Grape/ant - several angles of knee flexion    Abdominal isometrics  1. Center  2. Diagonals           Therapeutic Activity      Neuromuscular Education kineiotaping R heel    Neutral tension techniques     TNE Education      HEP Hamstring isometric    SLS    DF/PF in sitting     Bosu ball balance Abdominal isometrics  1. Center  2. Diagonals    Adductor squeeze with machine    Leg press  Grape/ant - several angles of knee flexion       Assessment: No adverse effects to treatment.    The pt displayed an antalgic gait at the beginning of the session and reported relief of her R heel pain by the end of the session. The pt was able to heel strike and toe off on the R LE after therapy this date. Goals: The pt was educated on the plan of care, purpose and individual goals for therapy, precautions for therapy. All questions were answered. 1.  The pt will be independent in their HEP. MET 2/17/2023  2. Centralization of symptoms to the lumbar spine. PROGRESSING 2/17/2023  3. The pt will be independent in a modified workout program.  PROGRESSING 2/17/2023  4. The pt will report 75% improvement overall. MET 2/17/2023  5. The pt will be able to walk 2 miles without an increase in pain. MET 2/17/2023  6. The pt will display probably body mechanics when bending over to pick an item off the floor. MET 2/17/2023    Frequency / Duration: Patient will be seen for 1-2 x/week or a total of 8 visits over a 90 day period. Treatment will include: Manual Therapy; Therapeutic Exercises; Neuromuscular Re-education; Therapeutic Activity; Patient education: Home exercise program instruction; TNE Education; Modalities as needed. Education or treatment limitation: None  Rehab Potential good    Charges: Man2, TE1   Total Timed Treatment: 45 min  Total Treatment Time: 45 min    Patient was advised of these findings, precautions, and treatment options and has agreed to actively participate in planning and for this course of care. Thank you for your referral. Please co-sign or sign and return this letter via fax as soon as possible to [phone]. If you have any questions, please contact me at Dept: [phone].     Sincerely,  Emory Narayanan PT    Electronically signed by therapist: Emory Narayanan PT    [de-identified] certification required: Yes  I certify the need for these services furnished under this plan of treatment and while under my care.    X___________________________________________________ Date____________________    Certification From: 3/80/5091  To:5/20/2023

## 2023-03-03 ENCOUNTER — ORDER TRANSCRIPTION (OUTPATIENT)
Dept: PHYSICAL THERAPY | Facility: HOSPITAL | Age: 68
End: 2023-03-03

## 2023-03-03 DIAGNOSIS — M72.2 PLANTAR FASCIAL FIBROMATOSIS: Primary | ICD-10-CM

## 2023-03-06 ENCOUNTER — OFFICE VISIT (OUTPATIENT)
Dept: PHYSICAL THERAPY | Facility: HOSPITAL | Age: 68
End: 2023-03-06
Attending: PHYSICIAN ASSISTANT
Payer: MEDICARE

## 2023-03-06 PROCEDURE — 97140 MANUAL THERAPY 1/> REGIONS: CPT | Performed by: PHYSICAL THERAPIST

## 2023-03-06 PROCEDURE — 97110 THERAPEUTIC EXERCISES: CPT | Performed by: PHYSICAL THERAPIST

## 2023-03-06 NOTE — PROGRESS NOTES
3/6/2023  Patient Name: Linette Walsh  YOB: 1955          MRN number:  [id number]  Referring Physician:  CLEO Conner/Dr. Ayala Linton    Dx:      Status post 2023: Dr. Ayala Linton: L4 and L5 laminectomies with medial facetectomies      Authorized # of Visits:  10 visits on POC          Next MD visit: none   Fall Risk: standard         Precautions:  S/p lumbar spine surgery - post surgical precautions  Medication Changes since last visit?: No    Subjective: States she is still having buttock/hamstring and R foot pain. States she is very frustrated with her lack of progress. Pain Ratin/10 VAS     Objective:     STRENGTH:   5/5 MMT Scale EVAL/2023   Left  Right Comments   Hip Flexion (L2) 5/5  4+/5    Knee Extension (L3) 4+/5 4+/5    Knee Flexion 5/5 5/5    Ankle DF (L4) 5/5 4+/5    EHL (L5) 5/5 4/5    Ankle PF (S1) 5/5 4/5    Hip Abduction NT NT    Hip Extension NT NT           2023  Visit #3 2023  Visit #4 3/2/2023  Visit #5 3/6/2023  Visit #6   Manual Therapy STM bilateral QL's and incisional area STM bilateral QL's and incisional area STM bilateral QL's and incisional area/R piriformis area    Neutral flossing R LE    Ankle joint mobs R     STM bilateral QL's and incisional area/R piriformis area    Neutral flossing R LE    Ankle joint mobs R   Therapeutic Exercise Hamstring isometric    SLS    DF/PF in sitting     Bosu ball balance Abdominal isometrics  1. Center  2. Diagonals    Adductor squeeze with machine    Leg press  Grape/ant - several angles of knee flexion    Abdominal isometrics  1. Center  2. Diagonals         DKTC    Lumbar flexion in sitting       Therapeutic Activity       Neuromuscular Education kineiotaping R heel    Neutral tension techniques      TNE Education       HEP Hamstring isometric    SLS    DF/PF in sitting     Bosu ball balance Abdominal isometrics  1. Center  2.   Diagonals    Adductor squeeze with machine    Leg press  Grape/ant - several angles of knee flexion DKTC    Lumbar flexion in sitting       Assessment: No adverse effects to treatment. The pt continues to have foot symptoms. The pt was advised to trial repeated flexion in supine and in sitting as an alternative. Educated in centralization of symptoms and precautions for new activities. Goals: The pt was educated on the plan of care, purpose and individual goals for therapy, precautions for therapy. All questions were answered. 1.  The pt will be independent in their HEP. MET 2/17/2023  2. Centralization of symptoms to the lumbar spine. PROGRESSING 2/17/2023  3. The pt will be independent in a modified workout program.  PROGRESSING 2/17/2023  4. The pt will report 75% improvement overall. MET 2/17/2023  5. The pt will be able to walk 2 miles without an increase in pain. MET 2/17/2023  6. The pt will display probably body mechanics when bending over to pick an item off the floor. MET 2/17/2023    Frequency / Duration: Patient will be seen for 1-2 x/week or a total of 8 visits over a 90 day period. Treatment will include: Manual Therapy; Therapeutic Exercises; Neuromuscular Re-education; Therapeutic Activity; Patient education: Home exercise program instruction; TNE Education; Modalities as needed. Education or treatment limitation: None  Rehab Potential good    Charges: Man2, TE1   Total Timed Treatment: 45 min  Total Treatment Time: 45 min    Patient was advised of these findings, precautions, and treatment options and has agreed to actively participate in planning and for this course of care. Thank you for your referral. Please co-sign or sign and return this letter via fax as soon as possible to [phone]. If you have any questions, please contact me at Dept: [phone].     Sincerely,  Madonna Mooney PT    Electronically signed by therapist: Madonna Mooney PT    [de-identified] certification required: Yes  I certify the need for these services furnished under this plan of treatment and while under my care.     X___________________________________________________ Date____________________    Certification From: 2/38/0778  To:5/20/2023

## 2023-03-09 ENCOUNTER — OFFICE VISIT (OUTPATIENT)
Dept: PHYSICAL THERAPY | Facility: HOSPITAL | Age: 68
End: 2023-03-09
Attending: PHYSICIAN ASSISTANT
Payer: MEDICARE

## 2023-03-09 PROCEDURE — 97140 MANUAL THERAPY 1/> REGIONS: CPT | Performed by: PHYSICAL THERAPIST

## 2023-03-09 PROCEDURE — 97110 THERAPEUTIC EXERCISES: CPT | Performed by: PHYSICAL THERAPIST

## 2023-03-09 NOTE — PROGRESS NOTES
3/9/2023  Patient Name: Kain Henry  YOB: 1955          MRN number:  [id number]  Referring Physician:  CLEO Whittaker/Dr. Devon Edwards    Dx:      Status post 2023: Dr. Devon Edwards: L4 and L5 laminectomies with medial facetectomies      Authorized # of Visits:  10 visits on POC          Next MD visit: none   Fall Risk: standard         Precautions:  S/p lumbar spine surgery - post surgical precautions  Medication Changes since last visit?: No    Subjective: States she is slightly better. States her foot pain is not as intense but she still can't walk as long as she want to. Pain Ratin/10 VAS     Objective:     STRENGTH:   5/5 MMT Scale EVAL/2023   Left  Right Comments   Hip Flexion (L2) 5/5  4+/5    Knee Extension (L3) 4+/5 4+/5    Knee Flexion 5/5 5/5    Ankle DF (L4) 5/5 4+/5    EHL (L5) 5/5 4/5    Ankle PF (S1) 5/5 4/5    Hip Abduction NT NT    Hip Extension NT NT           3/2/2023  Visit #5 3/6/2023  Visit #6 3/9/2023  Visit #7   Manual Therapy STM bilateral QL's and incisional area/R piriformis area    Neutral flossing R LE    Ankle joint mobs R     STM bilateral QL's and incisional area/R piriformis area    Neutral flossing R LE    Ankle joint mobs R STM bilateral QL's and incisional area/R piriformis area    Neutral flossing R LE    Ankle joint mobs R   Therapeutic Exercise Grape/ant - several angles of knee flexion    Abdominal isometrics  1. Center  2. Diagonals         DKTC    Lumbar flexion in sitting     DKTC    Lumbar flexion in sitting   Therapeutic Activity      Neuromuscular Education      TNE Education      HEP Grape/ant - several angles of knee flexion DKTC    Lumbar flexion in sitting DKTC    Lumbar flexion in sitting       Assessment: No adverse effects to treatment. Slightly less pain this date with slight improvement in gait pattern. Advised to continue [address] as often as possible and to use lumbar flexion when she is not able to perform supine DKTC. Advised to change positions often and avoid prolonged walking. Goals: The pt was educated on the plan of care, purpose and individual goals for therapy, precautions for therapy. All questions were answered. 1.  The pt will be independent in their HEP. MET 2/17/2023  2. Centralization of symptoms to the lumbar spine. PROGRESSING 2/17/2023  3. The pt will be independent in a modified workout program.  PROGRESSING 2/17/2023  4. The pt will report 75% improvement overall. MET 2/17/2023  5. The pt will be able to walk 2 miles without an increase in pain. MET 2/17/2023  6. The pt will display probably body mechanics when bending over to pick an item off the floor. MET 2/17/2023    Frequency / Duration: Patient will be seen for 1-2 x/week or a total of 8 visits over a 90 day period. Treatment will include: Manual Therapy; Therapeutic Exercises; Neuromuscular Re-education; Therapeutic Activity; Patient education: Home exercise program instruction; TNE Education; Modalities as needed. Education or treatment limitation: None  Rehab Potential good    Charges: Man2, TE1   Total Timed Treatment: 45 min  Total Treatment Time: 45 min    Patient was advised of these findings, precautions, and treatment options and has agreed to actively participate in planning and for this course of care. Thank you for your referral. Please co-sign or sign and return this letter via fax as soon as possible to [phone]. If you have any questions, please contact me at Dept: [phone]. Sincerely,  Gretchen Hankins PT    Electronically signed by therapist: Gretchne Hankins PT    [de-identified] certification required: Yes  I certify the need for these services furnished under this plan of treatment and while under my care.     X___________________________________________________ Date____________________    Certification From: 9/72/3642  To:5/20/2023

## 2023-03-13 ENCOUNTER — TELEPHONE (OUTPATIENT)
Dept: SURGERY | Facility: CLINIC | Age: 68
End: 2023-03-13

## 2023-03-13 ENCOUNTER — OFFICE VISIT (OUTPATIENT)
Dept: PHYSICAL THERAPY | Facility: HOSPITAL | Age: 68
End: 2023-03-13
Attending: PHYSICIAN ASSISTANT
Payer: MEDICARE

## 2023-03-13 PROCEDURE — 97140 MANUAL THERAPY 1/> REGIONS: CPT | Performed by: PHYSICAL THERAPIST

## 2023-03-13 PROCEDURE — 97110 THERAPEUTIC EXERCISES: CPT | Performed by: PHYSICAL THERAPIST

## 2023-03-13 NOTE — TELEPHONE ENCOUNTER
This medication is not refilled regularly. She already had script on 1/13/23, and additional script on 2/21/23    Routed to provider to determine if another script is appropriate.

## 2023-03-13 NOTE — PROGRESS NOTES
3/13/2023    Patient Name: Tony Jimenez  YOB: 1955          MRN number:  [id number]  Referring Physician:  CLEO Stanton/Dr. Ash Garcia    Dx:      Status post 2023: Dr. Aleman Lovelock: L4 and L5 laminectomies with medial facetectomies      Authorized # of Visits:  10 visits on POC          Next MD visit: none   Fall Risk: standard         Precautions:  S/p lumbar spine surgery - post surgical precautions  Medication Changes since last visit?: No    Subjective: States she is about the same. Still has pain in her foot and states the LBP is getting worse. Feels like she is scarring down in the LB. States she was considering calling to the doctor to see if she can get a Medrol Dose Pack to decrease her symptoms. States she has only been riding her bike and doing her HEP, States she stopped going to the gym. Pain Ratin/10 VAS     Objective:     STRENGTH:   5/5 MMT Scale EVAL/2023   Left  Right Comments   Hip Flexion (L2) 5/5  4+/5    Knee Extension (L3) 4+/5 4+/5    Knee Flexion 5/5 5/5    Ankle DF (L4) 5/5 4+/5    EHL (L5) 5/5 4/5    Ankle PF (S1) 5/5 4/5    Hip Abduction NT NT    Hip Extension NT NT           3/2/2023  Visit #5 3/6/2023  Visit #6 3/9/2023  Visit #7 3/13/2023  Visit #8   Manual Therapy STM bilateral QL's and incisional area/R piriformis area    Neutral flossing R LE    Ankle joint mobs R     STM bilateral QL's and incisional area/R piriformis area    Neutral flossing R LE    Ankle joint mobs R STM bilateral QL's and incisional area/R piriformis area    Neutral flossing R LE    Ankle joint mobs R STM bilateral QL's and incisional area/R piriformis area     Therapeutic Exercise Grape/ant - several angles of knee flexion    Abdominal isometrics  1. Center  2. Diagonals         DKTC    Lumbar flexion in sitting     DKTC    Lumbar flexion in sitting 1/2 table top    Full table top    Abdominal isometrics  1. Center  2.   Diagonals   Therapeutic Activity Neuromuscular Education    Standing wall supported IO/TA    Standing supported wall reach    TNE Education       HEP Grape/ant - several angles of knee flexion DKTC    Lumbar flexion in sitting DKTC    Lumbar flexion in sitting Standing wall supported IO/TA    Full table top    Abdominal isometrics  1. Center  2. Diagonals       Assessment: No adverse effects to treatment. The pt displays moderate restrictions around the incision area. She continues to have R foot pain. Continued to progress her abdominal strengthening program but advised to contact her MD regarding her continued symptoms. Goals: The pt was educated on the plan of care, purpose and individual goals for therapy, precautions for therapy. All questions were answered. 1.  The pt will be independent in their HEP. MET 2/17/2023  2. Centralization of symptoms to the lumbar spine. PROGRESSING 2/17/2023  3. The pt will be independent in a modified workout program.  PROGRESSING 2/17/2023  4. The pt will report 75% improvement overall. MET 2/17/2023  5. The pt will be able to walk 2 miles without an increase in pain. MET 2/17/2023  6. The pt will display probably body mechanics when bending over to pick an item off the floor. MET 2/17/2023    Frequency / Duration: Patient will be seen for 1-2 x/week or a total of 8 visits over a 90 day period. Treatment will include: Manual Therapy; Therapeutic Exercises; Neuromuscular Re-education; Therapeutic Activity; Patient education: Home exercise program instruction; TNE Education; Modalities as needed. Education or treatment limitation: None  Rehab Potential good    Charges: Man1 (15), TE2 (30)   Total Timed Treatment: 45 min  Total Treatment Time: 45 min    Patient was advised of these findings, precautions, and treatment options and has agreed to actively participate in planning and for this course of care.     Thank you for your referral. Please co-sign or sign and return this letter via fax as soon as possible to [phone]. If you have any questions, please contact me at Dept: [phone]. Sincerely,  Shavon Marte PT    Electronically signed by therapist: Shavon Marte PT    [de-identified] certification required: Yes  I certify the need for these services furnished under this plan of treatment and while under my care.     X___________________________________________________ Date____________________    Certification From: 9/13/0715  To:5/20/2023

## 2023-03-13 NOTE — TELEPHONE ENCOUNTER
Patient calling to request refill of methylPREDNISolone (MEDROL) 4 MG Oral Tablet Therapy [address] [phone], [phone]  Patient informed of 48 hour refill policy excluding weekends and holidays. Informed patient prescription is sent directly to pharmacy. Further explained patient will not receive a call back once prescription is ready.

## 2023-03-14 RX ORDER — METHYLPREDNISOLONE 4 MG/1
TABLET ORAL
Qty: 1 EACH | Refills: 0 | Status: SHIPPED | OUTPATIENT
Start: 2023-03-14

## 2023-03-14 RX ORDER — GABAPENTIN 100 MG/1
CAPSULE ORAL
Qty: 180 CAPSULE | Refills: 1 | Status: SHIPPED | OUTPATIENT
Start: 2023-03-14

## 2023-03-14 NOTE — TELEPHONE ENCOUNTER
Can we schedule patient on 4/6 at 1 pm?  OK to double book    Called and spoke with patient. Pt has primarily heel complaints and is seeing PT for this. Pt had steroid injection from podiatry and has had little relief with this. Will refill MDP as she has not taken the previous RX- she has saved this for her [place]. Also given Gabapentin RX.

## 2023-03-16 ENCOUNTER — OFFICE VISIT (OUTPATIENT)
Dept: PHYSICAL THERAPY | Facility: HOSPITAL | Age: 68
End: 2023-03-16
Attending: PHYSICIAN ASSISTANT
Payer: MEDICARE

## 2023-03-16 PROCEDURE — 97140 MANUAL THERAPY 1/> REGIONS: CPT | Performed by: PHYSICAL THERAPIST

## 2023-03-20 ENCOUNTER — OFFICE VISIT (OUTPATIENT)
Dept: PHYSICAL THERAPY | Facility: HOSPITAL | Age: 68
End: 2023-03-20
Attending: PHYSICIAN ASSISTANT
Payer: MEDICARE

## 2023-03-20 PROCEDURE — 97112 NEUROMUSCULAR REEDUCATION: CPT | Performed by: PHYSICAL THERAPIST

## 2023-03-20 PROCEDURE — 97140 MANUAL THERAPY 1/> REGIONS: CPT | Performed by: PHYSICAL THERAPIST

## 2023-03-20 NOTE — PROGRESS NOTES
3/20/2023  Patient Name: Theresa Wakefield  YOB: 1955          MRN number:  [id number]  Referring Physician:  CLEO Valdivia/Dr. Trivedi Proradha    Dx:      Status post 2023: Dr. Trivedi Proud: L4 and L5 laminectomies with medial facetectomies      Authorized # of Visits:  10 visits on POC          Next MD visit: none   Fall Risk: standard         Precautions:  S/p lumbar spine surgery - post surgical precautions  Medication Changes since last visit?: No    Subjective: States she is feeling slightly better. Still has pain on the back of the heel. State she has never been a good sleeper. States she often gets up to go the bathroom. States she is sleeping much better because of the Gabapentin. Pain Ratin/10 VAS in her foot, the rest of her leg is 1/10 VAS    Objective:     STRENGTH:   5/5 MMT Scale EVAL/2023   Left  Right Comments   Hip Flexion (L2) 5/5  4+/5    Knee Extension (L3) 4+/5 4+/5    Knee Flexion 5/5 5/5    Ankle DF (L4) 5/5 4+/5    EHL (L5) 5/5 4/5    Ankle PF (S1) 5/5 4/5    Hip Abduction NT NT    Hip Extension NT NT           3/9/2023  Visit #7 3/13/2023  Visit #8 3/16/2023  Visit #9 3/20/2023  Visit #10   Manual Therapy STM bilateral QL's and incisional area/R piriformis area    Neutral flossing R LE    Ankle joint mobs R STM bilateral QL's and incisional area/R piriformis area   STM bilateral QL's and incisional area/R piriformis area    Neutral flossing R LE    Ankle joint mobs R STM bilateral QL's and incisional area/R piriformis area    Ankle joint mobs R    Achilles STM   Therapeutic Exercise DKTC    Lumbar flexion in sitting 1/2 table top    Full table top    Abdominal isometrics  1. Center  2.   Diagonals  relacing shoes - education on shoe wear    Education on shoe wear   Therapeutic Activity       Neuromuscular Education  Standing wall supported IO/TA    Standing supported wall reach      TNE Education       HEP DKTC    Lumbar flexion in sitting Standing wall supported IO/TA    Full table top    Abdominal isometrics  1. Center  2. Diagonals         Assessment:  Chester Roberson has completed 10 visits of PT and has progressed well overall. She now only feels pain in the back of her R heel since starting a Medrol Dose Pack and Gabapentin. States her shoe slips and irritates her heel. Re-tied her shoes to improve closure and prevent heel slipping. The pt displays less STR\"s in the lumbar spine, R piriformis and R heel. She is compliant with her HEP and is progressing well towards her remaining goals. Recommend continued skilled PT to further decrease her pain and improve her functional mobility. Goals: The pt was educated on the plan of care, purpose and individual goals for therapy, precautions for therapy. All questions were answered. 1.  The pt will be independent in their HEP. MET 2/17/2023  2. Centralization of symptoms to the lumbar spine. PROGRESSING 3/20/2023  3. The pt will be independent in a modified workout program.  PROGRESSING 3/20/2023  4. The pt will report 75% improvement overall. MET 2/17/2023  5. The pt will be able to walk 2 miles without an increase in pain. MET 2/17/2023  6. The pt will display probably body mechanics when bending over to pick an item off the floor. MET 2/17/2023    Frequency / Duration: Patient will be seen for 1-2 x/week or a total of 8 visits over a 90 day period. Treatment will include: Manual Therapy; Therapeutic Exercises; Neuromuscular Re-education; Therapeutic Activity; Patient education: Home exercise program instruction; TNE Education; Modalities as needed. Education or treatment limitation: None  Rehab Potential good    Charges: Man2(30), TE1 (15)  Total Timed Treatment: 45 min  Total Treatment Time: 45 min    Patient was advised of these findings, precautions, and treatment options and has agreed to actively participate in planning and for this course of care.     Thank you for your referral. Please co-sign or sign and return this letter via fax as soon as possible to [phone]. If you have any questions, please contact me at Dept: [phone]. Sincerely,  Oxana Brown PT    Electronically signed by therapist: Oxana Brown PT    [de-identified] certification required: Yes  I certify the need for these services furnished under this plan of treatment and while under my care.     X___________________________________________________ Date____________________    Certification From: 4/59/9951 to  6/12/2023

## 2023-03-23 ENCOUNTER — OFFICE VISIT (OUTPATIENT)
Dept: PHYSICAL THERAPY | Facility: HOSPITAL | Age: 68
End: 2023-03-23
Attending: PHYSICIAN ASSISTANT
Payer: MEDICARE

## 2023-03-23 PROCEDURE — 97140 MANUAL THERAPY 1/> REGIONS: CPT | Performed by: PHYSICAL THERAPIST

## 2023-03-23 NOTE — PROGRESS NOTES
3/23/2023  Patient Name: Frances Mendez  YOB: 1955          MRN number:  [id number]  Referring Physician:  CLEO Garza/Dr. Layla Hoffmann    Dx:      Status post 2023: Dr. Layla Hoffmann: L4 and L5 laminectomies with medial facetectomies      Authorized # of Visits:  10 visits on POC          Next MD visit: none   Fall Risk: standard         Precautions:  S/p lumbar spine surgery - post surgical precautions  Medication Changes since last visit?: No  Subjective: States she is feeling slightly better. State she is trying not to limp. Got new shoes but hasn't worn them enough yo know if they are helping. Pain Ratin/10 VAS in her foot, the rest of her leg is 1/10 VAS    Objective:     STRENGTH:   5/5 MMT Scale EVAL/2023   Left  Right Comments   Hip Flexion (L2) 5/5  4+/5    Knee Extension (L3) 4+/5 4+/5    Knee Flexion 5/5 5/5    Ankle DF (L4) 5/5 4+/5    EHL (L5) 5/5 4/5    Ankle PF (S1) 5/5 4/5    Hip Abduction NT NT    Hip Extension NT NT           3/9/2023  Visit #7 3/13/2023  Visit #8 3/16/2023  Visit #9 3/20/2023  Visit #10 3/23/2023  Visit #11   Manual Therapy STM bilateral QL's and incisional area/R piriformis area    Neutral flossing R LE    Ankle joint mobs R STM bilateral QL's and incisional area/R piriformis area   STM bilateral QL's and incisional area/R piriformis area    Neutral flossing R LE    Ankle joint mobs R STM bilateral QL's and incisional area/R piriformis area    Ankle joint mobs R    Achilles STM STM bilateral QL's and incisional area/R piriformis area    Ankle joint mobs R    Achilles STM   Therapeutic Exercise DKTC    Lumbar flexion in sitting 1/2 table top    Full table top    Abdominal isometrics  1. Center  2.   Diagonals  relacing shoes - education on shoe wear    Education on shoe wear    Therapeutic Activity        Neuromuscular Education  Standing wall supported IO/TA    Standing supported wall reach       TNE Education        HEP DKTC    Lumbar flexion in sitting Standing wall supported IO/TA    Full table top    Abdominal isometrics  1. Center  2. Diagonals          Assessment:  No adverse effects to treatment. Focused on manual therapy to further relieve symptoms. Gait pattern improved in [employer] shoes. Goals: The pt was educated on the plan of care, purpose and individual goals for therapy, precautions for therapy. All questions were answered. 1.  The pt will be independent in their HEP. MET 2/17/2023  2. Centralization of symptoms to the lumbar spine. PROGRESSING 3/20/2023  3. The pt will be independent in a modified workout program.  PROGRESSING 3/20/2023  4. The pt will report 75% improvement overall. MET 2/17/2023  5. The pt will be able to walk 2 miles without an increase in pain. MET 2/17/2023  6. The pt will display probably body mechanics when bending over to pick an item off the floor. MET 2/17/2023    Frequency / Duration: Patient will be seen for 1-2 x/week or a total of 8 visits over a 90 day period. Treatment will include: Manual Therapy; Therapeutic Exercises; Neuromuscular Re-education; Therapeutic Activity; Patient education: Home exercise program instruction; TNE Education; Modalities as needed. Education or treatment limitation: None  Rehab Potential good    Charges: Man3 (45)  Total Timed Treatment: 45 min  Total Treatment Time: 45 min    Patient was advised of these findings, precautions, and treatment options and has agreed to actively participate in planning and for this course of care. Thank you for your referral. Please co-sign or sign and return this letter via fax as soon as possible to [phone]. If you have any questions, please contact me at Dept: [phone].     Sincerely,  Emerson Coleman PT    Electronically signed by therapist: Emerson Coleman PT    [de-identified] certification required: Yes  I certify the need for these services furnished under this plan of treatment and while under my care.    X___________________________________________________ Date____________________    Certification From: 3/56/3062 to  6/12/2023

## 2023-04-04 ENCOUNTER — OFFICE VISIT (OUTPATIENT)
Dept: PHYSICAL THERAPY | Facility: HOSPITAL | Age: 68
End: 2023-04-04
Attending: NEUROLOGICAL SURGERY
Payer: MEDICARE

## 2023-04-04 PROCEDURE — 97140 MANUAL THERAPY 1/> REGIONS: CPT | Performed by: PHYSICAL THERAPIST

## 2023-04-04 PROCEDURE — 97112 NEUROMUSCULAR REEDUCATION: CPT | Performed by: PHYSICAL THERAPIST

## 2023-04-04 NOTE — PROGRESS NOTES
2023  Patient Name: Dustin Soria  YOB: 1955          MRN number:  [id number]  Referring Physician:  CLEO Sam/Dr. Vickey Goodell    Dx:      Status post 2023: Dr. Vickey Goodell: L4 and L5 laminectomies with medial facetectomies      Authorized # of Visits:  10 visits on POC          Next MD visit: none   Fall Risk: standard         Precautions:  S/p lumbar spine surgery - post surgical precautions  Medication Changes since last visit?: No  Subjective: States she was a little worse from note being in therapy for a week. States she still has pain in her heel with walking. States she feels pain in the R buttock if she stands too long. Pain Ratin/10 VAS at the worst, 0/10 VAS currently    Objective:     STRENGTH:   5/5 MMT Scale EVAL/2023   Left  Right Comments   Hip Flexion (L2) 5/5  4+/5    Knee Extension (L3) 4+/5 4+/5    Knee Flexion 5/5 5/5    Ankle DF (L4) 5/5 4+/5    EHL (L5) 5/5 4/5    Ankle PF (S1) 5/5 4/5    Hip Abduction NT NT    Hip Extension NT NT           3/16/2023  Visit #9 3/20/2023  Visit #10 3/23/2023  Visit #11 2023  Visit #12   Manual Therapy STM bilateral QL's and incisional area/R piriformis area    Neutral flossing R LE    Ankle joint mobs R STM bilateral QL's and incisional area/R piriformis area    Ankle joint mobs R    Achilles STM STM bilateral QL's and incisional area/R piriformis area    Ankle joint mobs R    Achilles STM STM bilateral QL's and incisional area/R piriformis area    Ankle joint mobs R    Achilles STM   Therapeutic Exercise  relacing shoes - education on shoe wear    Education on shoe wear     Therapeutic Activity       Neuromuscular Education    Taping R foot   TNE Education       HEP           Assessment:  No adverse effects to treatment. Focused on manual therapy to further relieve symptoms. Tape foot and the patient reported significantly less pain from taping. Will continue her current HEP. Goals:       The pt was educated on the plan of care, purpose and individual goals for therapy, precautions for therapy. All questions were answered. 1.  The pt will be independent in their HEP. MET 2/17/2023  2. Centralization of symptoms to the lumbar spine. PROGRESSING 3/20/2023  3. The pt will be independent in a modified workout program.  PROGRESSING 3/20/2023  4. The pt will report 75% improvement overall. MET 2/17/2023  5. The pt will be able to walk 2 miles without an increase in pain. MET 2/17/2023  6. The pt will display probably body mechanics when bending over to pick an item off the floor. MET 2/17/2023    Frequency / Duration: Patient will be seen for 1-2 x/week or a total of 8 visits over a 90 day period. Treatment will include: Manual Therapy; Therapeutic Exercises; Neuromuscular Re-education; Therapeutic Activity; Patient education: Home exercise program instruction; TNE Education; Modalities as needed. Education or treatment limitation: None  Rehab Potential good    Charges: Man2 (30), NM1 (15) Total Timed Treatment: 45 min  Total Treatment Time: 45 min    Patient was advised of these findings, precautions, and treatment options and has agreed to actively participate in planning and for this course of care. Thank you for your referral. Please co-sign or sign and return this letter via fax as soon as possible to [phone]. If you have any questions, please contact me at Dept: [phone]. Sincerely,  Cosme Maxwell PT    Electronically signed by therapist: Cosme Maxwell PT    [de-identified] certification required: Yes  I certify the need for these services furnished under this plan of treatment and while under my care.     X___________________________________________________ Date____________________    Certification From: 4/07/6855 to  6/12/2023

## 2023-04-05 ENCOUNTER — TELEPHONE (OUTPATIENT)
Dept: ORTHOPEDICS CLINIC | Facility: CLINIC | Age: 68
End: 2023-04-05

## 2023-04-05 DIAGNOSIS — M79.671 HEEL PAIN, CHRONIC, RIGHT: Primary | ICD-10-CM

## 2023-04-05 DIAGNOSIS — G89.29 HEEL PAIN, CHRONIC, RIGHT: Primary | ICD-10-CM

## 2023-04-05 NOTE — TELEPHONE ENCOUNTER
Reviewed patients chart, xray orders are required. Order placed for right heel xrays.  Please contact patient advise to arrive 30 mins prior to patients appt to complete x-ray order and schedule patients xray appt-Thank you

## 2023-04-05 NOTE — TELEPHONE ENCOUNTER
Future Appointments   Date Time Provider Deanne Valerie   4/27/2023  2:00 PM Ben Tavarez., EVONM [facility]       [facility] to schedule xray or comeearly prior to appt.

## 2023-04-05 NOTE — TELEPHONE ENCOUNTER
NP Right Ft Heel Pain. Please advise if imaging is needed.   Future Appointments   Date Time Provider Deanne Padilla   4/6/2023  1:00 PM Shiela Louise MD ENINAPER2 [facility]   4/7/2023 11:00 AM Mila Hemphill, PT [facility] NEURO PT EM [facility]   4/27/2023  2:00 PM Aram George., DPM [facility]   5/12/2023  2:15 PM Mila Hemphill, PT [facility] NEURO PT EM [facility]   5/18/2023  3:00 PM Mila Hemphill, PT [facility] NEURO PT EM [facility]   5/25/2023 11:45 AM Mila Hemphill, PT [facility] NEURO PT EM [facility]   6/1/2023  1:30 PM Bette Nieves, PT [facility] NEURO PT EM [facility]

## 2023-04-06 ENCOUNTER — OFFICE VISIT (OUTPATIENT)
Dept: SURGERY | Facility: CLINIC | Age: 68
End: 2023-04-06
Payer: MEDICARE

## 2023-04-06 VITALS
HEIGHT: 67 IN | DIASTOLIC BLOOD PRESSURE: 86 MMHG | WEIGHT: 205 LBS | SYSTOLIC BLOOD PRESSURE: 120 MMHG | HEART RATE: 78 BPM | BODY MASS INDEX: 32.18 KG/M2

## 2023-04-06 DIAGNOSIS — M48.02 CERVICAL STENOSIS OF SPINE: ICD-10-CM

## 2023-04-06 DIAGNOSIS — M50.20 CERVICAL HERNIATED DISC: ICD-10-CM

## 2023-04-06 DIAGNOSIS — Z98.890 S/P LAMINECTOMY: Primary | ICD-10-CM

## 2023-04-06 DIAGNOSIS — M50.90 CERVICAL DISC DISEASE: ICD-10-CM

## 2023-04-06 PROCEDURE — 99024 POSTOP FOLLOW-UP VISIT: CPT | Performed by: NEUROLOGICAL SURGERY

## 2023-04-06 RX ORDER — MELOXICAM 15 MG/1
15 TABLET ORAL DAILY
COMMUNITY
Start: 2023-03-09

## 2023-04-06 RX ORDER — PIOGLITAZONEHYDROCHLORIDE 30 MG/1
30 TABLET ORAL DAILY
COMMUNITY
Start: 2023-02-21

## 2023-04-06 RX ORDER — METHYLPREDNISOLONE 4 MG/1
4 TABLET ORAL DAILY
COMMUNITY

## 2023-04-06 NOTE — PROGRESS NOTES
Established patient:  Reason for follow up:   Back pain    Numeric Rating Scale:   Pain at Present:  0/10 when sittng       Distribution of Pain:    right    Most recent treatments for Current Pain Condition:   Physical Therapy,Gabapentin    Response to treatment: some relief    New imaging or testing since your last office visit:    No recent imaging

## 2023-04-07 ENCOUNTER — OFFICE VISIT (OUTPATIENT)
Dept: PHYSICAL THERAPY | Facility: HOSPITAL | Age: 68
End: 2023-04-07
Attending: NEUROLOGICAL SURGERY
Payer: MEDICARE

## 2023-04-07 PROCEDURE — 97140 MANUAL THERAPY 1/> REGIONS: CPT | Performed by: PHYSICAL THERAPIST

## 2023-04-07 PROCEDURE — 97112 NEUROMUSCULAR REEDUCATION: CPT | Performed by: PHYSICAL THERAPIST

## 2023-04-07 NOTE — PROGRESS NOTES
2023  Patient Name: Donna Valdez  YOB: 1955          MRN number:  [id number]  Referring Physician:  CLEO Live/Dr. Michelle Miller    Dx:      Status post 2023: Dr. Michelle Miller: L4 and L5 laminectomies with medial facetectomies      Authorized # of Visits:  18 visits total on POC          Next MD visit: none   Fall Risk: standard         Precautions:  S/p lumbar spine surgery - post surgical precautions  Medication Changes since last visit?: No  Subjective: States she felt a lot of relief from the taping at the last visit. States his pain is less intense in the R foot. Reports her piriformis is still painful. States she is doing stretching for the piriformis. Saw Dr. Michelle Miller who told her she has piriformis syndrome. Pain Ratin/10 VAS at the worst, 0/10 VAS currently    Objective:     STRENGTH:   5/5 MMT Scale EVAL/2023   Left  Right Comments   Hip Flexion (L2) 5/5  4+/5    Knee Extension (L3) 4+/5 4+/5    Knee Flexion 5/5 5/5    Ankle DF (L4) 5/5 4+/5    EHL (L5) 5/5 4/5    Ankle PF (S1) 5/5 4/5    Hip Abduction NT NT    Hip Extension NT NT           2023  Visit #12 2023  Visit #13   Manual Therapy STM bilateral QL's and incisional area/R piriformis area    Ankle joint mobs R    Achilles STM STM bilateral QL's and incisional area/R piriformis area    Ankle joint mobs R    Achilles STM    STM incision area   Therapeutic Exercise     Therapeutic Activity     Neuromuscular Education Taping R foot Taping R foot    R glut max against the wall   TNE Education     HEP  R glut max against the wall       Assessment:  No adverse effects to treatment. Less pain overall this date with min to mod restriction around the scar. The pt was also less TTP of the R plantar fascial surface. Re-taped foot per the patient's request.  The pt was also given the R glut max isometric against the wall to decreased c/o pain in the R piriformis area. Given an updated HEP.   Will resume PT once the patient returns from traveling. Goals: The pt was educated on the plan of care, purpose and individual goals for therapy, precautions for therapy. All questions were answered. 1.  The pt will be independent in their HEP. MET 2/17/2023  2. Centralization of symptoms to the lumbar spine. PROGRESSING 3/20/2023  3. The pt will be independent in a modified workout program.  PROGRESSING 3/20/2023  4. The pt will report 75% improvement overall. MET 2/17/2023  5. The pt will be able to walk 2 miles without an increase in pain. MET 2/17/2023  6. The pt will display probably body mechanics when bending over to pick an item off the floor. MET 2/17/2023    Frequency / Duration: Patient will be seen for 1-2 x/week or a total of 8 visits over a 90 day period. Treatment will include: Manual Therapy; Therapeutic Exercises; Neuromuscular Re-education; Therapeutic Activity; Patient education: Home exercise program instruction; TNE Education; Modalities as needed. Education or treatment limitation: None  Rehab Potential good    Charges: Man2 (30), NM1 (15) Total Timed Treatment: 45 min  Total Treatment Time: 45 min    Patient was advised of these findings, precautions, and treatment options and has agreed to actively participate in planning and for this course of care. Thank you for your referral. Please co-sign or sign and return this letter via fax as soon as possible to [phone]. If you have any questions, please contact me at Dept: [phone]. Sincerely,  Edgar Harris PT    Electronically signed by therapist: Edgar Harris PT    [de-identified] certification required: Yes  I certify the need for these services furnished under this plan of treatment and while under my care.     X___________________________________________________ Date____________________    Certification From: 0/60/3318 to  6/12/2023

## 2023-04-28 ENCOUNTER — OFFICE VISIT (OUTPATIENT)
Dept: ORTHOPEDICS CLINIC | Facility: CLINIC | Age: 68
End: 2023-04-28
Payer: MEDICARE

## 2023-04-28 ENCOUNTER — HOSPITAL ENCOUNTER (OUTPATIENT)
Dept: GENERAL RADIOLOGY | Age: 68
Discharge: HOME | End: 2023-04-28
Attending: PODIATRIST
Payer: MEDICARE

## 2023-04-28 VITALS — HEIGHT: 67 IN | WEIGHT: 205 LBS | BODY MASS INDEX: 32.18 KG/M2

## 2023-04-28 DIAGNOSIS — M77.51 BURSITIS OF HEEL, RIGHT: ICD-10-CM

## 2023-04-28 DIAGNOSIS — M77.31 HEEL SPUR, RIGHT: ICD-10-CM

## 2023-04-28 DIAGNOSIS — M79.671 HEEL PAIN, CHRONIC, RIGHT: ICD-10-CM

## 2023-04-28 DIAGNOSIS — M72.2 PLANTAR FASCIITIS: Primary | ICD-10-CM

## 2023-04-28 DIAGNOSIS — G89.29 HEEL PAIN, CHRONIC, RIGHT: ICD-10-CM

## 2023-04-28 DIAGNOSIS — E11.42 DIABETIC POLYNEUROPATHY ASSOCIATED WITH TYPE 2 DIABETES MELLITUS (HCC): ICD-10-CM

## 2023-04-28 DIAGNOSIS — M76.60 INSERTIONAL ACHILLES TENDINOPATHY: ICD-10-CM

## 2023-04-28 PROCEDURE — 73650 X-RAY EXAM OF HEEL: CPT | Performed by: PODIATRIST

## 2023-04-28 PROCEDURE — 20551 NJX 1 TENDON ORIGIN/INSJ: CPT | Performed by: PODIATRIST

## 2023-04-28 PROCEDURE — 99203 OFFICE O/P NEW LOW 30 MIN: CPT | Performed by: PODIATRIST

## 2023-04-28 RX ORDER — BETAMETHASONE SODIUM PHOSPHATE AND BETAMETHASONE ACETATE 3; 3 MG/ML; MG/ML
4.2 INJECTION, SUSPENSION INTRA-ARTICULAR; INTRALESIONAL; INTRAMUSCULAR; SOFT TISSUE ONCE
Status: COMPLETED | OUTPATIENT
Start: 2023-04-28 | End: 2023-04-28

## 2023-04-28 RX ADMIN — BETAMETHASONE SODIUM PHOSPHATE AND BETAMETHASONE ACETATE 4.2 MG: 3; 3 INJECTION, SUSPENSION INTRA-ARTICULAR; INTRALESIONAL; INTRAMUSCULAR; SOFT TISSUE at 12:15:00

## 2023-05-12 ENCOUNTER — OFFICE VISIT (OUTPATIENT)
Dept: PHYSICAL THERAPY | Facility: HOSPITAL | Age: 68
End: 2023-05-12
Attending: NEUROLOGICAL SURGERY
Payer: MEDICARE

## 2023-05-12 PROCEDURE — 97110 THERAPEUTIC EXERCISES: CPT | Performed by: PHYSICAL THERAPIST

## 2023-05-12 PROCEDURE — 97140 MANUAL THERAPY 1/> REGIONS: CPT | Performed by: PHYSICAL THERAPIST

## 2023-05-12 NOTE — PROGRESS NOTES
2023    Patient Name: Syeda Valdez  YOB: 1955          MRN number:  [id number]  Referring Physician:  CLEO Brown/Dr. Pranav Carter    Dx:      Status post 2023: Dr. Rock Fees: L4 and L5 laminectomies with medial facetectomies      Authorized # of Visits:  18 visits total on POC          Next MD visit: none   Fall Risk: standard         Precautions:  S/p lumbar spine surgery - post surgical precautions  Medication Changes since last visit?: No  Subjective: States she saw Dr. Riley Sullivan who gave her injection in her foot which helped. States her leg pain is gone. States she has been gardening and has been able to bend over. States traveling was good. States she didn't have any problems. Feels she needs more work on her scar. Wants to start working on strengthening her back muscles. Wearing gel heel cup which as also decreased her foot pain. .    Pain Ratin-2/10 VAS currently    Objective:     STRENGTH:   5/5 MMT Scale EVAL/2023   Left  Right Comments   Hip Flexion (L2) 5/5  4+/5    Knee Extension (L3) 4+/5 4+/5    Knee Flexion 5/5 5/5    Ankle DF (L4) 5/5 4+/5    EHL (L5) 5/5 4/5    Ankle PF (S1) 5/5 4/5    Hip Abduction NT NT    Hip Extension NT NT           2023  Visit #12 2023  Visit #13 2023  Visit #14   Manual Therapy STM bilateral QL's and incisional area/R piriformis area    Ankle joint mobs R    Achilles STM STM bilateral QL's and incisional area/R piriformis area    Ankle joint mobs R    Achilles STM    STM incision area STM bilateral QL's and incisional area/R piriformis area   Therapeutic Exercise   Red Tband   1. Rows  2. Shoulder extension    Clam shells    Review of previous HEP       Therapeutic Activity      Neuromuscular Education Taping R foot Taping R foot    R glut max against the wall Red Tband   1. Rows  2.   Shoulder extension    Clam shells   TNE Education      HEP  R glut max against the wall        Assessment:  No adverse effects to treatment. The pt's symptoms are less since she returned from traveling. Progressed strengthening for her lumbar paraspinals and hips this date. Given an updated HEP. Goals: The pt was educated on the plan of care, purpose and individual goals for therapy, precautions for therapy. All questions were answered. 1.  The pt will be independent in their HEP. MET 2/17/2023  2. Centralization of symptoms to the lumbar spine. PROGRESSING 3/20/2023  3. The pt will be independent in a modified workout program.  PROGRESSING 3/20/2023  4. The pt will report 75% improvement overall. MET 2/17/2023  5. The pt will be able to walk 2 miles without an increase in pain. MET 2/17/2023  6. The pt will display probably body mechanics when bending over to pick an item off the floor. MET 2/17/2023    Frequency / Duration: Patient will be seen for 1-2 x/week or a total of 8 visits over a 90 day period. Treatment will include: Manual Therapy; Therapeutic Exercises; Neuromuscular Re-education; Therapeutic Activity; Patient education: Home exercise program instruction; TNE Education; Modalities as needed. Education or treatment limitation: None  Rehab Potential good    Charges: Man2 (30), TE1 (15) Total Timed Treatment: 45 min  Total Treatment Time: 45 min    Patient was advised of these findings, precautions, and treatment options and has agreed to actively participate in planning and for this course of care. Thank you for your referral. Please co-sign or sign and return this letter via fax as soon as possible to [phone]. If you have any questions, please contact me at Dept: [phone]. Sincerely,  Shari Perry PT    Electronically signed by therapist: Shari Perry PT    [de-identified] certification required: Yes  I certify the need for these services furnished under this plan of treatment and while under my care.     X___________________________________________________ Date____________________    Certification From: 1/99/3027 to  6/12/2023

## 2023-05-18 ENCOUNTER — APPOINTMENT (OUTPATIENT)
Dept: PHYSICAL THERAPY | Facility: HOSPITAL | Age: 68
End: 2023-05-18
Attending: NEUROLOGICAL SURGERY
Payer: MEDICARE

## 2023-05-18 NOTE — PROGRESS NOTES
2023    Patient Name: Levi No  YOB: 1955          MRN number:  [id number]  Referring Physician:  CLEO Barrett/Dr. Anny Coughlin    Dx:      Status post 2023: Dr. Anny Coughlin: L4 and L5 laminectomies with medial facetectomies      Authorized # of Visits:  18 visits total on POC          Next MD visit: none   Fall Risk: standard         Precautions:  S/p lumbar spine surgery - post surgical precautions  Medication Changes since last visit?: No  Subjective: States she has been gardening and was helping laying a brick driveway. States she has been spreading dirt and grass seed. State her back feels very good. States her feet is improving. Has been taping her foot. Pain Ratin-2/10 VAS currently    Objective:     STRENGTH:   5/5 MMT Scale EVAL/2023   Left  Right Comments   Hip Flexion (L2) 5/5  4+/5    Knee Extension (L3) 4+/5 4+/5    Knee Flexion 5/5 5/5    Ankle DF (L4) 5/5 4+/5    EHL (L5) 5/5 4/5    Ankle PF (S1) 5/5 4/5    Hip Abduction NT NT    Hip Extension NT NT           2023  Visit #12 2023  Visit #13 2023  Visit #14 2023  Visit #15   Manual Therapy STM bilateral QL's and incisional area/R piriformis area    Ankle joint mobs R    Achilles STM STM bilateral QL's and incisional area/R piriformis area    Ankle joint mobs R    Achilles STM    STM incision area STM bilateral QL's and incisional area/R piriformis area STM bilateral QL's and incisional area/R piriformis area   Therapeutic Exercise   Red Tband   1. Rows  2. Shoulder extension    Clam shells    Review of previous HEP        Therapeutic Activity       Neuromuscular Education Taping R foot Taping R foot    R glut max against the wall Red Tband   1. Rows  2. Shoulder extension    Clam shells    TNE Education       HEP  R glut max against the wall         Assessment:  No adverse effects to treatment. Goals:       The pt was educated on the plan of care, purpose and individual goals for therapy, precautions for therapy. All questions were answered. 1.  The pt will be independent in their HEP. MET 2/17/2023  2. Centralization of symptoms to the lumbar spine. PROGRESSING 3/20/2023  3. The pt will be independent in a modified workout program.  PROGRESSING 3/20/2023  4. The pt will report 75% improvement overall. MET 2/17/2023  5. The pt will be able to walk 2 miles without an increase in pain. MET 2/17/2023  6. The pt will display probably body mechanics when bending over to pick an item off the floor. MET 2/17/2023    Frequency / Duration: Patient will be seen for 1-2 x/week or a total of 8 visits over a 90 day period. Treatment will include: Manual Therapy; Therapeutic Exercises; Neuromuscular Re-education; Therapeutic Activity; Patient education: Home exercise program instruction; TNE Education; Modalities as needed. Education or treatment limitation: None  Rehab Potential good    Charges: Man2 (30), TE1 (15) Total Timed Treatment: 45 min  Total Treatment Time: 45 min    Patient was advised of these findings, precautions, and treatment options and has agreed to actively participate in planning and for this course of care. Thank you for your referral. Please co-sign or sign and return this letter via fax as soon as possible to [phone]. If you have any questions, please contact me at Dept: [phone]. Sincerely,  Gretchen Hankins PT    Electronically signed by therapist: Gretchen Hankins PT    [de-identified] certification required: Yes  I certify the need for these services furnished under this plan of treatment and while under my care.     X___________________________________________________ Date____________________    Certification From: 9/24/7337 to  6/12/2023

## 2023-05-25 ENCOUNTER — APPOINTMENT (OUTPATIENT)
Dept: PHYSICAL THERAPY | Facility: HOSPITAL | Age: 68
End: 2023-05-25
Attending: NEUROLOGICAL SURGERY
Payer: MEDICARE

## 2023-05-30 ENCOUNTER — HOSPITAL ENCOUNTER (OUTPATIENT)
Dept: MRI IMAGING | Facility: HOSPITAL | Age: 68
Discharge: HOME | End: 2023-05-30
Attending: NURSE PRACTITIONER
Payer: MEDICARE

## 2023-05-30 ENCOUNTER — HOSPITAL ENCOUNTER (OUTPATIENT)
Dept: CT IMAGING | Facility: HOSPITAL | Age: 68
Discharge: HOME | End: 2023-05-30
Attending: NURSE PRACTITIONER
Payer: MEDICARE

## 2023-05-30 DIAGNOSIS — M50.90 CERVICAL DISC DISEASE: ICD-10-CM

## 2023-05-30 DIAGNOSIS — M50.20 CERVICAL HERNIATED DISC: ICD-10-CM

## 2023-05-30 DIAGNOSIS — M48.02 CERVICAL STENOSIS OF SPINE: ICD-10-CM

## 2023-05-30 PROCEDURE — 72125 CT NECK SPINE W/O DYE: CPT | Performed by: NURSE PRACTITIONER

## 2023-05-30 PROCEDURE — 72141 MRI NECK SPINE W/O DYE: CPT | Performed by: NURSE PRACTITIONER

## 2023-06-01 ENCOUNTER — APPOINTMENT (OUTPATIENT)
Dept: PHYSICAL THERAPY | Facility: HOSPITAL | Age: 68
End: 2023-06-01
Attending: NEUROLOGICAL SURGERY
Payer: MEDICARE

## 2023-06-06 ENCOUNTER — TELEPHONE (OUTPATIENT)
Dept: SURGERY | Facility: CLINIC | Age: 68
End: 2023-06-06

## 2023-06-06 ENCOUNTER — OFFICE VISIT (OUTPATIENT)
Dept: SURGERY | Facility: CLINIC | Age: 68
End: 2023-06-06
Payer: MEDICARE

## 2023-06-06 VITALS
WEIGHT: 205 LBS | SYSTOLIC BLOOD PRESSURE: 126 MMHG | HEART RATE: 66 BPM | HEIGHT: 67 IN | BODY MASS INDEX: 32.18 KG/M2 | DIASTOLIC BLOOD PRESSURE: 80 MMHG

## 2023-06-06 DIAGNOSIS — G95.9 CERVICAL MYELOPATHY (HCC): Primary | ICD-10-CM

## 2023-06-06 PROCEDURE — 99214 OFFICE O/P EST MOD 30 MIN: CPT | Performed by: NEUROLOGICAL SURGERY

## 2023-06-06 NOTE — PROGRESS NOTES
Established patient:  Reason for follow up:   Laminectomy   Review imaging     Numeric Rating Scale:  Pain at Present:  6/10       Distribution of Pain: neck and bilateral arm pain    Most recent treatments for Current Pain Condition: none     New imaging or testing since your last office visit:    CT spine cervical 05/30/23  MRI spine cervical 05/30/23

## 2023-06-07 ENCOUNTER — PATIENT MESSAGE (OUTPATIENT)
Dept: SURGERY | Facility: CLINIC | Age: 68
End: 2023-06-07

## 2023-06-08 NOTE — TELEPHONE ENCOUNTER
From: Shell Cardona  To: Debbie Scott MD  Sent: 6/7/2023 4:35 PM CDT  Subject: neck surgery    I forgot to ask You said I have a bulging disk which I knew about because I get spasms down my back that are like little sledgehammers. I am hoping that you will be taking care of this problem as long as you are in there. Am I correct?

## 2023-06-08 NOTE — TELEPHONE ENCOUNTER
Noted that patient has messaged, inquiring if Dr. Rogers Oviedo will be addressing the bulging disc during anticipated surgery. Patient to undergo surgery with Dr. Rogers Oviedo at a future date to be determined:    CERVICAL 4, CERVICAL 5, CERVICAL 6, PARTIAL 3 AND PARTIAL 7 LAMINECTOMIES, CERVICAL 4/ CERVICAL 5, CERVICAL 5/ CERVICAL 6 POSTERIOR SPINAL FUSION WITH INSTRUMENTATION, ALLOGRAFT, AUTOGRAFT AND ALL INDICATED PROCEDURES    MRI cervical spine had been completed on 5/30/23. Routed to [facility]. ROBBY Kenney and the JAKE pool.

## 2023-06-14 ENCOUNTER — PATIENT MESSAGE (OUTPATIENT)
Dept: SURGERY | Facility: CLINIC | Age: 68
End: 2023-06-14

## 2023-06-14 NOTE — TELEPHONE ENCOUNTER
Received a message from patient reporting obtaining previous clearances:  she had gotten pre-op clearance for January 2023 surgery last November from her PCP, and that she is \"talking to cardiologist now\". Reply sent, stating that patient needs a PCP clearance within 30 days of anticipated September 2023 surgery. Patient reported having \"physical\" in November of 2022. EKG-obtained in November of 2022. No recent CXR on file. Message sent to JESSY Cooney, requesting testing requirement confirmation.

## 2023-06-16 NOTE — TELEPHONE ENCOUNTER
Usually her PCP would do an EKG for clearance.  That is up to them if they need to repeat it in order to clear her for surgery

## 2023-06-16 NOTE — TELEPHONE ENCOUNTER
Noted the provider message listed below.     Noted the provider is defering to PCP for additional preop testing

## 2023-06-19 NOTE — TELEPHONE ENCOUNTER
Surgical Request Placed for 09/06/2023 at [facility]    CPT Codes: Ap, [address], [facility] [facility], [address] [facility], [id number] (h9), [phone]

## 2023-06-20 NOTE — TELEPHONE ENCOUNTER
Patient is scheduled for C4-C6, Partial C3 & C7 Laminectomies, C4-C6 Posterior Fusion on 9.6. 21 with Dr Devon Edwards at [facility].    NA pre-op apt scheduled (if sx is more than 30 days from last apt)  Y Surgical instructions reviewed by nursing staff with patient  Mallissa Heath form completed  Y Surgery order signed   Y Placed sx on surgery sheet  Y Placed on outlook calendar  Y [employer] message sent to patient with sx instructions  Y Faxed pre-op clearance request to PCP [address]   NA Faxed letter to prescribing provider requesting anticoagulants be held for surgery  NA E-mail sent to [address] 289 appointments made  Y PA MEDICARE. Routed to PA team to initiate. Y Post-Op outreach pt reminder placed.    Y Entire Neurosurgery Checklist Completed

## 2023-08-01 RX ORDER — GARLIC EXTRACT 500 MG
1 CAPSULE ORAL DAILY
COMMUNITY

## 2023-08-08 ENCOUNTER — PATIENT MESSAGE (OUTPATIENT)
Dept: SURGERY | Facility: CLINIC | Age: 68
End: 2023-08-08

## 2023-08-08 NOTE — TELEPHONE ENCOUNTER
From: Chantal Dickens  To: Gutierrez Lopes MD  Sent: 8/8/2023 8:47 AM CDT  Subject: medications    I have 2 questions. Is it ok to have my teeth cleaned on Aug 31? this is less than 1 week before the surgery? Also since I cannot take ibuprophen 10 days before the surgery is it ok to take the Gabapehtin 100 mg? or does this affect the blood thinning too?

## 2023-08-08 NOTE — TELEPHONE ENCOUNTER
Noted that patient has messaged with pre-op questions. Patient is to undergo surgery with Dr. Danielle Becerra on 9/6/23:    CERVICAL 4, CERVICAL 5, CERVICAL 6, PARTIAL 3 AND PARTIAL 7 LAMINECTOMIES, CERVICAL 4-CERVICAL 5, CERVICAL 5-CERVICAL 6 POSTERIOR SPINAL FUSION WITH INSTRUMENTATION,     Patient inquiring about a possible 8/31/23 dental cleaning, and if she may continue to take Gabapentin up until the surgery. Per Dr. Danielle Becerra, patient may have dental cleaning and continue to take Gabapentin up until surgery date. Messaged patient via [employer] with above feedback.

## 2023-08-09 RX ORDER — GABAPENTIN 100 MG/1
100 CAPSULE ORAL 3 TIMES DAILY
Qty: 90 CAPSULE | Refills: 0 | Status: SHIPPED | OUTPATIENT
Start: 2023-08-09

## 2023-08-09 NOTE — TELEPHONE ENCOUNTER
Pt requesting gabapentin rx. Pt was taking gabapentin earlier in the year. Last fill date was 3.14.23. Routed to provider for review & input.

## 2023-08-10 ENCOUNTER — LABORATORY ENCOUNTER (OUTPATIENT)
Dept: LAB | Age: 68
End: 2023-08-10
Attending: NEUROLOGICAL SURGERY
Payer: MEDICARE

## 2023-08-10 DIAGNOSIS — G95.9 CERVICAL MYELOPATHY (HCC): ICD-10-CM

## 2023-08-10 LAB
ANTIBODY SCREEN: NEGATIVE
RH BLOOD TYPE: NEGATIVE

## 2023-08-10 PROCEDURE — 86901 BLOOD TYPING SEROLOGIC RH(D): CPT

## 2023-08-10 PROCEDURE — 36415 COLL VENOUS BLD VENIPUNCTURE: CPT

## 2023-08-10 PROCEDURE — 86850 RBC ANTIBODY SCREEN: CPT

## 2023-08-10 PROCEDURE — 86900 BLOOD TYPING SEROLOGIC ABO: CPT

## 2023-08-14 ENCOUNTER — PATIENT MESSAGE (OUTPATIENT)
Dept: SURGERY | Facility: CLINIC | Age: 68
End: 2023-08-14

## 2023-08-14 NOTE — TELEPHONE ENCOUNTER
From: Lisbeth George  To: Johnathan Ganser, MD  Sent: 8/14/2023 12:13 PM CDT  Subject: surgery    Just wondering about cranberry and probiotics. Can I take them up to the day of the surgery? I know no B12 or vitamins 10 days before but I forgot to ask about these 2.  Thank you

## 2023-08-14 NOTE — TELEPHONE ENCOUNTER
Noted that patient is scheduled to undergo surgery with Dr. Dc Bee on 9/6/23:    CERVICAL 4, CERVICAL 5, CERVICAL 6, PARTIAL 3 AND PARTIAL 7 LAMINECTOMIES, CERVICAL 4-CERVICAL 5, CERVICAL 5-CERVICAL 6 POSTERIOR SPINAL FUSION     And has asked via [email or web address] message if she may continue to take cranberry and probiotics prior to surgery or is it recommended she hold those supplements. Routed to [facility]. Elio Espinosa, and JAKE peterson.

## 2023-08-17 NOTE — TELEPHONE ENCOUNTER
Cards clearance received per letter dated 8.15.23, \"There are no cardiac contraindications to proceeding with necessary cervical fusion surgery. Kaveh Sofia is clear from a cardiac standpoint at low risk for complications. \"    Faxed to Angela Farida; sent to scan; copy placed in ppwrk sent to scan folder    Nothing further needed for upcoming surgery

## 2023-08-17 NOTE — TELEPHONE ENCOUNTER
PCP presurgical clearance received per OV note dated 8.17.23, Jose Mendez was evaluated at my office and has been medically cleared for their procedure/surgery as scheduled.:    Faxed to Angela Iqbal; sent to scan; copy placed in ppwrk sent to scan folder

## 2023-08-17 NOTE — TELEPHONE ENCOUNTER
Rcvd preop clearance;From Dr.Priya Shearer; Merritt Hawk 8/17/23;Placed in nursing bin for provider review

## 2023-08-25 ENCOUNTER — PATIENT MESSAGE (OUTPATIENT)
Dept: SURGERY | Facility: CLINIC | Age: 68
End: 2023-08-25

## 2023-08-28 NOTE — TELEPHONE ENCOUNTER
From: Renetta Vaughn  To: Ananya Malone MD  Sent: 8/25/2023 4:59 PM CDT  Subject: surgery    Do you have everything you need now?  Just checking to be sure

## 2023-08-28 NOTE — TELEPHONE ENCOUNTER
Reviewed schedule surgery TE    Per TE,  \"Cards clearance received per letter dated 8.15.23, \"There are no cardiac contraindications to proceeding with necessary cervical fusion surgery. Yunier Marie is clear from a cardiac standpoint at low risk for complications. \"     Faxed to Angela Farida; sent to scan; copy placed in ppwrk sent to scan folder     Nothing further needed for upcoming surgery\"        Mychart sent to pt informing her of this. Nothing further needed for this encounter.

## 2023-09-06 ENCOUNTER — ANESTHESIA EVENT (OUTPATIENT)
Dept: SURGERY | Facility: HOSPITAL | Age: 68
End: 2023-09-06
Payer: MEDICARE

## 2023-09-06 ENCOUNTER — HOSPITAL ENCOUNTER (INPATIENT)
Facility: HOSPITAL | Age: 68
LOS: 1 days | Discharge: HOME | End: 2023-09-07
Attending: NEUROLOGICAL SURGERY | Admitting: NEUROLOGICAL SURGERY
Payer: MEDICARE

## 2023-09-06 ENCOUNTER — APPOINTMENT (OUTPATIENT)
Dept: GENERAL RADIOLOGY | Facility: HOSPITAL | Age: 68
End: 2023-09-06
Attending: NEUROLOGICAL SURGERY
Payer: MEDICARE

## 2023-09-06 ENCOUNTER — ANESTHESIA (OUTPATIENT)
Dept: SURGERY | Facility: HOSPITAL | Age: 68
End: 2023-09-06
Payer: MEDICARE

## 2023-09-06 DIAGNOSIS — G95.9 CERVICAL MYELOPATHY (HCC): Primary | ICD-10-CM

## 2023-09-06 LAB
EST. AVERAGE GLUCOSE BLD GHB EST-MCNC: 163 MG/DL (ref 68–126)
GLUCOSE BLD-MCNC: 134 MG/DL (ref 70–99)
GLUCOSE BLD-MCNC: 198 MG/DL (ref 70–99)
GLUCOSE BLD-MCNC: 222 MG/DL (ref 70–99)
GLUCOSE BLD-MCNC: 290 MG/DL (ref 70–99)
GLUCOSE BLD-MCNC: 298 MG/DL (ref 70–99)
HBA1C MFR BLD: 7.3 % (ref ?–5.7)

## 2023-09-06 PROCEDURE — 00NW0ZZ RELEASE CERVICAL SPINAL CORD, OPEN APPROACH: ICD-10-PCS | Performed by: NEUROLOGICAL SURGERY

## 2023-09-06 PROCEDURE — 99223 1ST HOSP IP/OBS HIGH 75: CPT | Performed by: HOSPITALIST

## 2023-09-06 PROCEDURE — 0RG2071 FUSION OF 2 OR MORE CERVICAL VERTEBRAL JOINTS WITH AUTOLOGOUS TISSUE SUBSTITUTE, POSTERIOR APPROACH, POSTERIOR COLUMN, OPEN APPROACH: ICD-10-PCS | Performed by: NEUROLOGICAL SURGERY

## 2023-09-06 PROCEDURE — 76000 FLUOROSCOPY <1 HR PHYS/QHP: CPT | Performed by: NEUROLOGICAL SURGERY

## 2023-09-06 DEVICE — BLOCKER
Type: IMPLANTABLE DEVICE | Site: SPINE CERVICAL | Status: FUNCTIONAL
Brand: OASYS

## 2023-09-06 DEVICE — BIO DBM GEL
Type: IMPLANTABLE DEVICE | Site: SPINE CERVICAL | Status: FUNCTIONAL
Brand: BIO DBM

## 2023-09-06 DEVICE — ROD
Type: IMPLANTABLE DEVICE | Site: SPINE CERVICAL | Status: FUNCTIONAL
Brand: OASYS

## 2023-09-06 DEVICE — NON-BIASED POLYAXIAL SCREW
Type: IMPLANTABLE DEVICE | Site: SPINE CERVICAL | Status: FUNCTIONAL
Brand: OASYS

## 2023-09-06 RX ORDER — NALOXONE HYDROCHLORIDE 0.4 MG/ML
0.08 INJECTION, SOLUTION INTRAMUSCULAR; INTRAVENOUS; SUBCUTANEOUS AS NEEDED
Status: DISCONTINUED | OUTPATIENT
Start: 2023-09-06 | End: 2023-09-06 | Stop reason: HOSPADM

## 2023-09-06 RX ORDER — HYDROMORPHONE HYDROCHLORIDE 1 MG/ML
0.1 INJECTION, SOLUTION INTRAMUSCULAR; INTRAVENOUS; SUBCUTANEOUS EVERY 2 HOUR PRN
Status: DISCONTINUED | OUTPATIENT
Start: 2023-09-06 | End: 2023-09-07

## 2023-09-06 RX ORDER — METOCLOPRAMIDE HYDROCHLORIDE 5 MG/ML
INJECTION INTRAMUSCULAR; INTRAVENOUS AS NEEDED
Status: DISCONTINUED | OUTPATIENT
Start: 2023-09-06 | End: 2023-09-06 | Stop reason: SURG

## 2023-09-06 RX ORDER — INSULIN ASPART 100 [IU]/ML
INJECTION, SOLUTION INTRAVENOUS; SUBCUTANEOUS ONCE
Status: COMPLETED | OUTPATIENT
Start: 2023-09-06 | End: 2023-09-06

## 2023-09-06 RX ORDER — HYDROCODONE BITARTRATE AND ACETAMINOPHEN 5; 325 MG/1; MG/1
2 TABLET ORAL ONCE AS NEEDED
Status: DISCONTINUED | OUTPATIENT
Start: 2023-09-06 | End: 2023-09-06 | Stop reason: HOSPADM

## 2023-09-06 RX ORDER — METOCLOPRAMIDE HYDROCHLORIDE 5 MG/ML
10 INJECTION INTRAMUSCULAR; INTRAVENOUS EVERY 8 HOURS PRN
Status: DISCONTINUED | OUTPATIENT
Start: 2023-09-06 | End: 2023-09-07

## 2023-09-06 RX ORDER — DOCUSATE SODIUM 100 MG/1
100 CAPSULE, LIQUID FILLED ORAL 2 TIMES DAILY
Status: DISCONTINUED | OUTPATIENT
Start: 2023-09-06 | End: 2023-09-07

## 2023-09-06 RX ORDER — HYDROMORPHONE HYDROCHLORIDE 1 MG/ML
0.2 INJECTION, SOLUTION INTRAMUSCULAR; INTRAVENOUS; SUBCUTANEOUS EVERY 2 HOUR PRN
Status: DISCONTINUED | OUTPATIENT
Start: 2023-09-06 | End: 2023-09-07

## 2023-09-06 RX ORDER — SODIUM CHLORIDE, SODIUM LACTATE, POTASSIUM CHLORIDE, CALCIUM CHLORIDE 600; 310; 30; 20 MG/100ML; MG/100ML; MG/100ML; MG/100ML
INJECTION, SOLUTION INTRAVENOUS CONTINUOUS
Status: DISCONTINUED | OUTPATIENT
Start: 2023-09-06 | End: 2023-09-06 | Stop reason: HOSPADM

## 2023-09-06 RX ORDER — ACETAMINOPHEN 500 MG
1000 TABLET ORAL EVERY 8 HOURS SCHEDULED
Status: DISCONTINUED | OUTPATIENT
Start: 2023-09-06 | End: 2023-09-07

## 2023-09-06 RX ORDER — DEXTROSE MONOHYDRATE 25 G/50ML
50 INJECTION, SOLUTION INTRAVENOUS
Status: DISCONTINUED | OUTPATIENT
Start: 2023-09-06 | End: 2023-09-07

## 2023-09-06 RX ORDER — ROCURONIUM BROMIDE 10 MG/ML
INJECTION, SOLUTION INTRAVENOUS AS NEEDED
Status: DISCONTINUED | OUTPATIENT
Start: 2023-09-06 | End: 2023-09-06 | Stop reason: SURG

## 2023-09-06 RX ORDER — METOCLOPRAMIDE HYDROCHLORIDE 5 MG/ML
10 INJECTION INTRAMUSCULAR; INTRAVENOUS EVERY 8 HOURS PRN
Status: DISCONTINUED | OUTPATIENT
Start: 2023-09-06 | End: 2023-09-06 | Stop reason: HOSPADM

## 2023-09-06 RX ORDER — HYDROMORPHONE HYDROCHLORIDE 1 MG/ML
0.6 INJECTION, SOLUTION INTRAMUSCULAR; INTRAVENOUS; SUBCUTANEOUS EVERY 5 MIN PRN
Status: DISCONTINUED | OUTPATIENT
Start: 2023-09-06 | End: 2023-09-06 | Stop reason: HOSPADM

## 2023-09-06 RX ORDER — NICOTINE POLACRILEX 4 MG
15 LOZENGE BUCCAL
Status: DISCONTINUED | OUTPATIENT
Start: 2023-09-06 | End: 2023-09-07

## 2023-09-06 RX ORDER — ENEMA 19; 7 G/133ML; G/133ML
1 ENEMA RECTAL ONCE AS NEEDED
Status: DISCONTINUED | OUTPATIENT
Start: 2023-09-06 | End: 2023-09-07

## 2023-09-06 RX ORDER — DIPHENHYDRAMINE HYDROCHLORIDE 50 MG/ML
25 INJECTION INTRAMUSCULAR; INTRAVENOUS EVERY 4 HOURS PRN
Status: DISCONTINUED | OUTPATIENT
Start: 2023-09-06 | End: 2023-09-07

## 2023-09-06 RX ORDER — NICOTINE POLACRILEX 4 MG
15 LOZENGE BUCCAL
Status: DISCONTINUED | OUTPATIENT
Start: 2023-09-06 | End: 2023-09-06 | Stop reason: HOSPADM

## 2023-09-06 RX ORDER — ONDANSETRON 2 MG/ML
INJECTION INTRAMUSCULAR; INTRAVENOUS AS NEEDED
Status: DISCONTINUED | OUTPATIENT
Start: 2023-09-06 | End: 2023-09-06 | Stop reason: SURG

## 2023-09-06 RX ORDER — DEXAMETHASONE SODIUM PHOSPHATE 4 MG/ML
VIAL (ML) INJECTION AS NEEDED
Status: DISCONTINUED | OUTPATIENT
Start: 2023-09-06 | End: 2023-09-06 | Stop reason: SURG

## 2023-09-06 RX ORDER — SODIUM CHLORIDE 9 MG/ML
INJECTION, SOLUTION INTRAVENOUS CONTINUOUS
Status: DISCONTINUED | OUTPATIENT
Start: 2023-09-06 | End: 2023-09-07

## 2023-09-06 RX ORDER — LIDOCAINE HYDROCHLORIDE 10 MG/ML
INJECTION, SOLUTION EPIDURAL; INFILTRATION; INTRACAUDAL; PERINEURAL AS NEEDED
Status: DISCONTINUED | OUTPATIENT
Start: 2023-09-06 | End: 2023-09-06 | Stop reason: SURG

## 2023-09-06 RX ORDER — HYDROMORPHONE HYDROCHLORIDE 1 MG/ML
0.4 INJECTION, SOLUTION INTRAMUSCULAR; INTRAVENOUS; SUBCUTANEOUS EVERY 2 HOUR PRN
Status: DISCONTINUED | OUTPATIENT
Start: 2023-09-06 | End: 2023-09-07

## 2023-09-06 RX ORDER — ONDANSETRON 2 MG/ML
4 INJECTION INTRAMUSCULAR; INTRAVENOUS EVERY 6 HOURS PRN
Status: DISCONTINUED | OUTPATIENT
Start: 2023-09-06 | End: 2023-09-06 | Stop reason: HOSPADM

## 2023-09-06 RX ORDER — ACETAMINOPHEN 500 MG
1000 TABLET ORAL ONCE AS NEEDED
Status: DISCONTINUED | OUTPATIENT
Start: 2023-09-06 | End: 2023-09-06 | Stop reason: HOSPADM

## 2023-09-06 RX ORDER — GABAPENTIN 100 MG/1
100 CAPSULE ORAL 3 TIMES DAILY
Status: DISCONTINUED | OUTPATIENT
Start: 2023-09-06 | End: 2023-09-07

## 2023-09-06 RX ORDER — BISACODYL 10 MG
10 SUPPOSITORY, RECTAL RECTAL
Status: DISCONTINUED | OUTPATIENT
Start: 2023-09-06 | End: 2023-09-07

## 2023-09-06 RX ORDER — ACETAMINOPHEN 500 MG
1000 TABLET ORAL ONCE
Status: DISCONTINUED | OUTPATIENT
Start: 2023-09-06 | End: 2023-09-06 | Stop reason: HOSPADM

## 2023-09-06 RX ORDER — CODEINE SULFATE 30 MG/1
TABLET ORAL EVERY 4 HOURS PRN
Status: DISCONTINUED | OUTPATIENT
Start: 2023-09-06 | End: 2023-09-07

## 2023-09-06 RX ORDER — CEFAZOLIN SODIUM/WATER 2 G/20 ML
2 SYRINGE (ML) INTRAVENOUS ONCE
Status: COMPLETED | OUTPATIENT
Start: 2023-09-06 | End: 2023-09-06

## 2023-09-06 RX ORDER — NICOTINE POLACRILEX 4 MG
30 LOZENGE BUCCAL
Status: DISCONTINUED | OUTPATIENT
Start: 2023-09-06 | End: 2023-09-06 | Stop reason: HOSPADM

## 2023-09-06 RX ORDER — VALSARTAN 320 MG/1
160 TABLET ORAL DAILY
Status: DISCONTINUED | OUTPATIENT
Start: 2023-09-06 | End: 2023-09-07

## 2023-09-06 RX ORDER — HYDROMORPHONE HYDROCHLORIDE 1 MG/ML
0.2 INJECTION, SOLUTION INTRAMUSCULAR; INTRAVENOUS; SUBCUTANEOUS EVERY 5 MIN PRN
Status: DISCONTINUED | OUTPATIENT
Start: 2023-09-06 | End: 2023-09-06 | Stop reason: HOSPADM

## 2023-09-06 RX ORDER — BUPIVACAINE HYDROCHLORIDE AND EPINEPHRINE 2.5; 5 MG/ML; UG/ML
INJECTION, SOLUTION EPIDURAL; INFILTRATION; INTRACAUDAL; PERINEURAL AS NEEDED
Status: DISCONTINUED | OUTPATIENT
Start: 2023-09-06 | End: 2023-09-06 | Stop reason: HOSPADM

## 2023-09-06 RX ORDER — NICOTINE POLACRILEX 4 MG
30 LOZENGE BUCCAL
Status: DISCONTINUED | OUTPATIENT
Start: 2023-09-06 | End: 2023-09-07

## 2023-09-06 RX ORDER — HYDROCODONE BITARTRATE AND ACETAMINOPHEN 5; 325 MG/1; MG/1
1 TABLET ORAL ONCE AS NEEDED
Status: DISCONTINUED | OUTPATIENT
Start: 2023-09-06 | End: 2023-09-06 | Stop reason: HOSPADM

## 2023-09-06 RX ORDER — ONDANSETRON 2 MG/ML
4 INJECTION INTRAMUSCULAR; INTRAVENOUS EVERY 6 HOURS PRN
Status: DISCONTINUED | OUTPATIENT
Start: 2023-09-06 | End: 2023-09-07

## 2023-09-06 RX ORDER — DIAZEPAM 2 MG/1
2 TABLET ORAL EVERY 6 HOURS PRN
Status: DISCONTINUED | OUTPATIENT
Start: 2023-09-06 | End: 2023-09-07

## 2023-09-06 RX ORDER — CEFAZOLIN SODIUM/WATER 2 G/20 ML
2 SYRINGE (ML) INTRAVENOUS EVERY 8 HOURS
Status: COMPLETED | OUTPATIENT
Start: 2023-09-06 | End: 2023-09-07

## 2023-09-06 RX ORDER — SODIUM CHLORIDE, SODIUM LACTATE, POTASSIUM CHLORIDE, CALCIUM CHLORIDE 600; 310; 30; 20 MG/100ML; MG/100ML; MG/100ML; MG/100ML
INJECTION, SOLUTION INTRAVENOUS CONTINUOUS
Status: DISCONTINUED | OUTPATIENT
Start: 2023-09-06 | End: 2023-09-07

## 2023-09-06 RX ORDER — CYCLOBENZAPRINE HCL 5 MG
2.5 TABLET ORAL 3 TIMES DAILY
Status: DISCONTINUED | OUTPATIENT
Start: 2023-09-06 | End: 2023-09-07

## 2023-09-06 RX ORDER — HYDROMORPHONE HYDROCHLORIDE 1 MG/ML
0.4 INJECTION, SOLUTION INTRAMUSCULAR; INTRAVENOUS; SUBCUTANEOUS EVERY 5 MIN PRN
Status: DISCONTINUED | OUTPATIENT
Start: 2023-09-06 | End: 2023-09-06 | Stop reason: HOSPADM

## 2023-09-06 RX ORDER — POLYETHYLENE GLYCOL 3350 17 G/17G
17 POWDER, FOR SOLUTION ORAL DAILY PRN
Status: DISCONTINUED | OUTPATIENT
Start: 2023-09-06 | End: 2023-09-07

## 2023-09-06 RX ORDER — PHENYLEPHRINE HCL 10 MG/ML
VIAL (ML) INJECTION AS NEEDED
Status: DISCONTINUED | OUTPATIENT
Start: 2023-09-06 | End: 2023-09-06 | Stop reason: SURG

## 2023-09-06 RX ORDER — CEFAZOLIN SODIUM/WATER 2 G/20 ML
SYRINGE (ML) INTRAVENOUS
Status: COMPLETED
Start: 2023-09-06 | End: 2023-09-06

## 2023-09-06 RX ORDER — DIPHENHYDRAMINE HCL 25 MG
25 CAPSULE ORAL EVERY 4 HOURS PRN
Status: DISCONTINUED | OUTPATIENT
Start: 2023-09-06 | End: 2023-09-07

## 2023-09-06 RX ORDER — SENNOSIDES 8.6 MG
17.2 TABLET ORAL NIGHTLY
Status: DISCONTINUED | OUTPATIENT
Start: 2023-09-06 | End: 2023-09-07

## 2023-09-06 RX ORDER — DEXTROSE MONOHYDRATE 25 G/50ML
50 INJECTION, SOLUTION INTRAVENOUS
Status: DISCONTINUED | OUTPATIENT
Start: 2023-09-06 | End: 2023-09-06 | Stop reason: HOSPADM

## 2023-09-06 RX ORDER — EPHEDRINE SULFATE 50 MG/ML
INJECTION INTRAVENOUS AS NEEDED
Status: DISCONTINUED | OUTPATIENT
Start: 2023-09-06 | End: 2023-09-06 | Stop reason: SURG

## 2023-09-06 RX ADMIN — METOCLOPRAMIDE HYDROCHLORIDE 10 MG: 5 INJECTION INTRAMUSCULAR; INTRAVENOUS at 10:02:00

## 2023-09-06 RX ADMIN — ONDANSETRON 4 MG: 2 INJECTION INTRAMUSCULAR; INTRAVENOUS at 10:02:00

## 2023-09-06 RX ADMIN — SODIUM CHLORIDE, SODIUM LACTATE, POTASSIUM CHLORIDE, CALCIUM CHLORIDE: 600; 310; 30; 20 INJECTION, SOLUTION INTRAVENOUS at 10:36:00

## 2023-09-06 RX ADMIN — ROCURONIUM BROMIDE 20 MG: 10 INJECTION, SOLUTION INTRAVENOUS at 09:13:00

## 2023-09-06 RX ADMIN — PHENYLEPHRINE HCL 100 MCG: 10 MG/ML VIAL (ML) INJECTION at 08:46:00

## 2023-09-06 RX ADMIN — EPHEDRINE SULFATE 5 MG: 50 INJECTION INTRAVENOUS at 08:52:00

## 2023-09-06 RX ADMIN — ROCURONIUM BROMIDE 10 MG: 10 INJECTION, SOLUTION INTRAVENOUS at 08:40:00

## 2023-09-06 RX ADMIN — DEXAMETHASONE SODIUM PHOSPHATE 8 MG: 4 MG/ML VIAL (ML) INJECTION at 08:22:00

## 2023-09-06 RX ADMIN — LIDOCAINE HYDROCHLORIDE 50 MG: 10 INJECTION, SOLUTION EPIDURAL; INFILTRATION; INTRACAUDAL; PERINEURAL at 07:44:00

## 2023-09-06 RX ADMIN — ROCURONIUM BROMIDE 50 MG: 10 INJECTION, SOLUTION INTRAVENOUS at 07:44:00

## 2023-09-06 RX ADMIN — PHENYLEPHRINE HCL 50 MCG: 10 MG/ML VIAL (ML) INJECTION at 08:24:00

## 2023-09-06 RX ADMIN — PHENYLEPHRINE HCL 100 MCG: 10 MG/ML VIAL (ML) INJECTION at 09:12:00

## 2023-09-06 RX ADMIN — PHENYLEPHRINE HCL 100 MCG: 10 MG/ML VIAL (ML) INJECTION at 08:50:00

## 2023-09-06 RX ADMIN — CEFAZOLIN SODIUM/WATER 2 G: 2 G/20 ML SYRINGE (ML) INTRAVENOUS at 07:52:00

## 2023-09-06 NOTE — PROGRESS NOTES
NURSING ADMISSION NOTE      Patient admitted via bed. Oriented to room. Safety precautions initiated. Bed in low position. Call light in reach. Pt is A&Ox4, wears glasses. Room air, continuous pulse oximetry, O2 sating WNL. Tele-NSR. Pt reported pain in neck, medications per [facility] given. Pt denies nausea and shortness of breath. Dressing on posterior neck C/D/I.  at bedside. Pt updated on POC, all questions and concerns addressed, pt verbalized understanding. Safety precautions in place, no further needs at this time. Admission navigator complete.

## 2023-09-06 NOTE — H&P
Neurosurgery History & Physical Examination    Patient Name: Roya Coronel  MRN: [id number]  [facility]: [id number]  YOB: 1955    Diagnosis: (G95.9) Cervical myelopathy     Present Illness: Very pleasant patient presents today for planned procedure. Previous HPI 6/6/23  Roya Coronel is a(n) 77year old female presents for follow-up  She is complaining of pain in her neck as well as down her arms  She does drop things  She would like to get her neck taken care of now  She got her MRI and CT scan    Has been NPO Yes  Does not take ASA, Plavix, or other anticoagulants. Denies recent illness such as fevers or GI symptoms. Labs from 8/10 reviewed: BMP, CBC, PT/INR all WNL.     acetaminophen (Tylenol Extra Strength) tab 1,000 mg, 1,000 mg, Oral, Once  glucose (Dex4) 15 GM/59ML oral liquid 15 g, 15 g, Oral, Q15 Min PRN   Or  glucose (Glutose) 40% oral gel 15 g, 15 g, Oral, Q15 Min PRN   Or  glucose-vitamin C (Dex-4) chewable tab 4 tablet, 4 tablet, Oral, Q15 Min PRN   Or  dextrose 50% injection 50 mL, 50 mL, Intravenous, Q15 Min PRN   Or  glucose (Dex4) 15 GM/59ML oral liquid 30 g, 30 g, Oral, Q15 Min PRN   Or  glucose (Glutose) 40% oral gel 30 g, 30 g, Oral, Q15 Min PRN   Or  glucose-vitamin C (Dex-4) chewable tab 8 tablet, 8 tablet, Oral, Q15 Min PRN  lactated ringers infusion, , Intravenous, Continuous  ceFAZolin (Ancef) 2 g in 20mL IV syringe premix, 2 g, Intravenous, Once  ceFAZolin (Ancef) 2 g/20mL IV syringe premix, , ,         Allergies:   Atorvastatin            MYALGIA  Rosuvastatin            MYALGIA    Comment:Purple bloches on stomach  Shellfish               NAUSEA AND VOMITING, DIZZINESS  Irbesartan              UNKNOWN    Comment:Doesn't help blood pressure  Metoprolol              NAUSEA ONLY    Comment:Doesn't help blood pressure    Past Medical History:   Diagnosis Date    Back problem     Diabetes ([facility] [address]     Diabetes mellitus ([facility] [address]     Difficult intubation     was informed that a \"glide\" scope should be used    Disorder of thyroid     Benign cyst to left thyroid    Esophageal reflux     Essential hypertension     High blood pressure     High cholesterol     HTN (hypertension)     Hx of motion sickness     Hypercholesterolemia     Neuropathy     Feet bilaterally    Osteoarthritis     PONV (postoperative nausea and vomiting)     Visual impairment     glasses/contacts     Past Surgical History:   Procedure Laterality Date          CARPAL TUNNEL RELEASE Bilateral     KNEE REPLACEMENT SURGERY Left     LAP ADJUSTABLE GASTRIC BAND      LAPAROSCOPIC CHOLECYSTECTOMY      OTHER SURGICAL HISTORY  2023    LUMBAR 4 AND LUMBAR 5 LAMINECTOMIES WITH MEDIAL FACETECTOMIES    ROTATOR CUFF REPAIR Bilateral      Family History   Problem Relation Age of Onset    High Blood Pressure Mother     High Blood Pressure Father      Social History    Tobacco Use      Smoking status: Former        Types: Cigarettes      Smokeless tobacco: Never      Tobacco comments: Quit 50 years ago, smoker for 1 year     Alcohol use: Not Currently      SYSTEM Check if Review is Normal Check if Physical Exam is Normal If not normal, please explain:   HEENT [X] [X]    NECK & BACK [ ] [X] See HPI   HEART [X] [X]    LUNGS [X] [X]    ABDOMEN [X] [X]    UROGENITAL [ ] [ ] deferred   EXTREMITIES [X] [X]      Neurological Exam:  Mental status: Oriented to person, place, and time   Speech: Fluent, no dysarthria  CN: II-XII grossly intact  Memory and comprehension: Intact   Motor: No drift, no focal arm or leg weakness. Upper extremity strength:      Deltoid  Biceps  Triceps   W.flexion  W.extension    Finger abduction     Right 5 5 5 5  5 5 5     Left 5 5 5 5 5 5 5     Lower extremity strength:      Iliospoas  Hamstrings  Quads  D-flexion  P-flexion EHL     Right 5 5 5 5 5 5     Left 5 5 5 5 5 5     Sensory: Intact to light touch    The above referenced H&P was reviewed by Cosmo Doll NP on 2023.   The patient was examined and no significant changes have occurred in the patient's condition since the H&P was performed. Dr. Kristy Zelaya reviewed with the patient and/or legal representative the potential benefits, risks and side effects of this procedure; the likelihood of the patient achieving goals; and potential problems that might occur during recuperation. Dr. Kristy Zelaya reviewed reasonable alternatives to the procedure, including risks, benefits and side effects related to the alternatives and risks related to not receiving this procedure. We will proceed with procedure as planned.        Claudetta Doles, APN  9/6/2023, 7:11 AM  [place] [id number]

## 2023-09-06 NOTE — BRIEF OP NOTE
Pre-Operative Diagnosis: Cervical myelopathy ([facility]) [G95.9]     Post-Operative Diagnosis: Cervical myelopathy ([facility] [address] [G95.9]      Procedure Performed:   CERVICAL 4, CERVICAL 5, CERVICAL 6, PARTIAL CERVICAL 3 AND PARTIAL CERVICAL  7 LAMINECTOMIES, CERVICAL 4-CERVICAL 5, CERVICAL 5-CERVICAL 6 POSTERIOR SPINAL FUSION WITH INSTRUMENTATION, ALLOGRAFT, AUTOGRAFT    Surgeon(s) and Role:     * Gary Morton MD - Primary    Assistant(s):  APN: ROBBY Najera     Surgical Findings: See dictation     Specimen: None     Estimated Blood Loss: Blood Output: 25 mL (9/6/2023 10:00 AM)        Nadine Horowitz MD  9/6/2023  10:29 AM

## 2023-09-06 NOTE — PLAN OF CARE
Problem: Patient/Family Goals  Goal: Patient/Family Long Term Goal  Description: Patient's Long Term Goal: Discharge with adequate resources    Interventions:  - Identify barriers to discharge w/pt and caregiver  - Include patient/family/discharge partner in discharge planning  - Arrange for needed discharge resources and transportation as appropriate  - Identify discharge learning needs   - Consider post-discharge preferences of patient/family/discharge partner  - Assess patient's ability to be responsible for managing their own health  - Refer to Case Management Department for coordinating discharge planning if the patient needs post-hospital services  - See additional Care Plan goals for specific interventions  Outcome: Progressing  Goal: Patient/Family Short Term Goal  Description: Patient's Short Term Goal:  9/6: pain control    Interventions:   - medications per [facility]  - repositioning  - See additional Care Plan goals for specific interventions  Outcome: Progressing     Problem: PAIN - ADULT  Goal: Verbalizes/displays adequate comfort level or patient's stated pain goal  Description: INTERVENTIONS:  - Encourage pt to monitor pain and request assistance  - Assess pain using appropriate pain scale  - Administer analgesics based on type and severity of pain and evaluate response  - Implement non-pharmacological measures as appropriate and evaluate response  - Consider cultural and social influences on pain and pain management  - Manage/alleviate anxiety  - Utilize distraction and/or relaxation techniques  - Monitor for opioid side effects  - Notify MD/LIP if interventions unsuccessful or patient reports new pain  - Anticipate increased pain with activity and pre-medicate as appropriate  Outcome: Progressing     Problem: RISK FOR INFECTION - ADULT  Goal: Absence of fever/infection during anticipated neutropenic period  Description: INTERVENTIONS  - Monitor WBC  - Administer growth factors as ordered  - Implement neutropenic guidelines  Outcome: Progressing     Problem: SAFETY ADULT - FALL  Goal: Free from fall injury  Description: INTERVENTIONS:  - Assess pt frequently for physical needs  - Identify cognitive and physical deficits and behaviors that affect risk of falls.   - Lynn fall precautions as indicated by assessment.  - Educate pt/family on patient safety including physical limitations  - Instruct pt to call for assistance with activity based on assessment  - Modify environment to reduce risk of injury  - Provide assistive devices as appropriate  - Consider OT/PT consult to assist with strengthening/mobility  - Encourage toileting schedule  Outcome: Progressing     Problem: DISCHARGE PLANNING  Goal: Discharge to home or other facility with appropriate resources  Description: INTERVENTIONS:  - Identify barriers to discharge w/pt and caregiver  - Include patient/family/discharge partner in discharge planning  - Arrange for needed discharge resources and transportation as appropriate  - Identify discharge learning needs (meds, wound care, etc)  - Arrange for interpreters to assist at discharge as needed  - Consider post-discharge preferences of patient/family/discharge partner  - Complete POLST form as appropriate  - Assess patient's ability to be responsible for managing their own health  - Refer to Case Management Department for coordinating discharge planning if the patient needs post-hospital services based on physician/LIP order or complex needs related to functional status, cognitive ability or social support system  Outcome: Progressing

## 2023-09-06 NOTE — OPERATIVE REPORT
Operative Note    Patient Name: Danilo Awan    Preoperative Diagnosis: Cervical myelopathy ([facility] [address] [G95.9]    Postoperative Diagnosis: same    Primary Surgeon: Lul Harrison MD    Assistant: ROBBY Gomez, who essentially case including opening, closing and retraction    Procedures: CERVICAL 4, CERVICAL 5, CERVICAL 6, PARTIAL CERVICAL 3 AND PARTIAL CERVICAL  7 LAMINECTOMIES, CERVICAL 4-CERVICAL 5, CERVICAL 5-CERVICAL 6 POSTERIOR SPINAL FUSION WITH INSTRUMENTATION, ALLOGRAFT, AUTOGRAFT    Surgical Findings: See dictation    Anesthesia: General    Complications: none    Implants: [place] [employer] rods and screws, DBM gel    Specimen: None    Drains: None    Condition: Stable    Estimated Blood Loss: 25 mL    Indication for Procedure: 70-year-old female with cervical myelopathy and cervical stenosis. Patient was seen in clinic. After discussion. Patient was planned for surgery. Procedure: Patient properly identified and brought back to the OR 16. Patient placed under general anesthesia by anesthesia staff. Smith was attached to patient's head. Patient was placed on the OR table, position and all pressure points padded. Smith attached the OR table. Fluoroscopy shot to confirm her levels. Patient was sterilely prepped and draped in usual fashion. 20 mils of quarter percent Marcaine with epinephrine was given as local anesthetic. Incision was made and dissection down to fascia. A subperiosteal dissection was performed along C3-C7. Hemostasis was achieved. Fluoroscopy from her levels. We then started with her pedicle entry points. First on the right. A starter awl, followed by the drill followed by a probe was done in almost holes were made on the right and then on the left. Then turned our attention to the laminectomies. The spinous processes were removed with a rongeur. The inferior lamina of C6 was deflated with a curette. We then remove the laminas using a trough technique. Once these were done the dura was nicely decompressed. We under bit C3 as well as a partial laminectomy at C7. Once this was done Liberty Owen confirmed above and below her compression sites. Screws were then placed in all the holes. Rods were then placed and set down with set screws. The setscrews were torqued and counter torque. High-speed drill was then used to decorticate the bone. 10 mils of DBM mixed with autograft was then used as grafting material.  Hemostasis was achieved. The wound was then closed in layers. Patient was detached from the OR table. Patient placed on the hospital bed. The Smith was removed from the patient's head.   Patient was awakened taken recovery    Dictated but not proofread

## 2023-09-06 NOTE — ANESTHESIA PROCEDURE NOTES
Airway  Date/Time: 9/6/2023 7:46 AM  Urgency: elective    Airway not difficult    General Information and Staff    Patient location during procedure: OR  Anesthesiologist: Antolin Luis DO  Performed: anesthesiologist   Performed by: Antolin Luis DO  Authorized by: Antolin Luis DO      Indications and Patient Condition  Indications for airway management: anesthesia  Spontaneous ventilation: present  Sedation level: deep  Preoxygenated: yes  Patient position: sniffing  Mask difficulty assessment: 1 - vent by mask    Final Airway Details  Final airway type: endotracheal airway      Successful airway: ETT  Cuffed: yes   Successful intubation technique: direct laryngoscopy  Endotracheal tube insertion site: oral  Blade: GlideScope  Blade size: #3  ETT size (mm): 7.5    Cormack-Lehane Classification: grade I - full view of glottis  Placement verified by: capnometry   Measured from: lips  ETT to lips (cm): 21  Number of attempts at approach: 1  Ventilation between attempts: none  Number of other approaches attempted: 0    Additional Comments  Dentition, lips, gums, intact and without injury as preinduction

## 2023-09-07 VITALS
DIASTOLIC BLOOD PRESSURE: 49 MMHG | OXYGEN SATURATION: 97 % | BODY MASS INDEX: 33.12 KG/M2 | HEIGHT: 67 IN | WEIGHT: 211 LBS | RESPIRATION RATE: 18 BRPM | TEMPERATURE: 99 F | SYSTOLIC BLOOD PRESSURE: 124 MMHG | HEART RATE: 81 BPM

## 2023-09-07 LAB
GLUCOSE BLD-MCNC: 171 MG/DL (ref 70–99)
GLUCOSE BLD-MCNC: 221 MG/DL (ref 70–99)
HCT VFR BLD AUTO: 36.3 %
HGB BLD-MCNC: 12.2 G/DL

## 2023-09-07 PROCEDURE — 99231 SBSQ HOSP IP/OBS SF/LOW 25: CPT | Performed by: INTERNAL MEDICINE

## 2023-09-07 RX ORDER — ACETAMINOPHEN AND CODEINE PHOSPHATE 300; 30 MG/1; MG/1
1-2 TABLET ORAL EVERY 4 HOURS PRN
Qty: 60 TABLET | Refills: 0 | Status: SHIPPED | OUTPATIENT
Start: 2023-09-07

## 2023-09-07 RX ORDER — CYCLOBENZAPRINE HCL 5 MG
TABLET ORAL 3 TIMES DAILY PRN
Qty: 30 TABLET | Refills: 0 | Status: SHIPPED | OUTPATIENT
Start: 2023-09-07

## 2023-09-07 NOTE — PLAN OF CARE
Assumed care of patient at 0700. POD#1 C3-C6   Pain is contorlled withmedication. OT cleared for discharge. ?  Home         Problem: Patient/Family Goals  Goal: Patient/Family Long Term Goal  Description: Patient's Long Term Goal: Discharge with adequate resources    Interventions:  - Identify barriers to discharge w/pt and caregiver  - Include patient/family/discharge partner in discharge planning  - Arrange for needed discharge resources and transportation as appropriate  - Identify discharge learning needs   - Consider post-discharge preferences of patient/family/discharge partner  - Assess patient's ability to be responsible for managing their own health  - Refer to Case Management Department for coordinating discharge planning if the patient needs post-hospital services  - See additional Care Plan goals for specific interventions  Outcome: Progressing  Goal: Patient/Family Short Term Goal  Description: Patient's Short Term Goal:  9/6: pain control    Interventions:   - medications per [facility]  - repositioning  - See additional Care Plan goals for specific interventions  Outcome: Progressing     Problem: PAIN - ADULT  Goal: Verbalizes/displays adequate comfort level or patient's stated pain goal  Description: INTERVENTIONS:  - Encourage pt to monitor pain and request assistance  - Assess pain using appropriate pain scale  - Administer analgesics based on type and severity of pain and evaluate response  - Implement non-pharmacological measures as appropriate and evaluate response  - Consider cultural and social influences on pain and pain management  - Manage/alleviate anxiety  - Utilize distraction and/or relaxation techniques  - Monitor for opioid side effects  - Notify MD/LIP if interventions unsuccessful or patient reports new pain  - Anticipate increased pain with activity and pre-medicate as appropriate  Outcome: Progressing     Problem: RISK FOR INFECTION - ADULT  Goal: Absence of fever/infection during anticipated neutropenic period  Description: INTERVENTIONS  - Monitor WBC  - Administer growth factors as ordered  - Implement neutropenic guidelines  Outcome: Progressing     Problem: SAFETY ADULT - FALL  Goal: Free from fall injury  Description: INTERVENTIONS:  - Assess pt frequently for physical needs  - Identify cognitive and physical deficits and behaviors that affect risk of falls.   - [place] fall precautions as indicated by assessment.  - Educate pt/family on patient safety including physical limitations  - Instruct pt to call for assistance with activity based on assessment  - Modify environment to reduce risk of injury  - Provide assistive devices as appropriate  - Consider OT/PT consult to assist with strengthening/mobility  - Encourage toileting schedule  Outcome: Progressing     Problem: DISCHARGE PLANNING  Goal: Discharge to home or other facility with appropriate resources  Description: INTERVENTIONS:  - Identify barriers to discharge w/pt and caregiver  - Include patient/family/discharge partner in discharge planning  - Arrange for needed discharge resources and transportation as appropriate  - Identify discharge learning needs (meds, wound care, etc)  - Arrange for interpreters to assist at discharge as needed  - Consider post-discharge preferences of patient/family/discharge partner  - Complete POLST form as appropriate  - Assess patient's ability to be responsible for managing their own health  - Refer to Case Management Department for coordinating discharge planning if the patient needs post-hospital services based on physician/LIP order or complex needs related to functional status, cognitive ability or social support system  Outcome: Progressing

## 2023-09-07 NOTE — PLAN OF CARE
A&O x4. VSS. 1L O2 nc PRN. . Pain well controlled with PO medication. Ambulating with min assist with walker. Voids freely to bathroom. Bilateral teds/SCDs. Coverlet dressing D/I with scant old drainage noted. N/T to BUE improving. IV ancef post op. Reviewed POC, pain management, IS use, and fall precautions with pt. Bed alarm on w/bed in lowest position. Pt reminded to use call light. Verbalized understanding.

## 2023-09-07 NOTE — PHYSICAL THERAPY NOTE
PHYSICAL THERAPY EVALUATION - INPATIENT     Room Number: 386/386-A  Evaluation Date: 9/7/2023  Type of Evaluation: Initial  Physician Order: PT Eval and Treat    Presenting Problem: status post posterior cervical fusion and laminectomy 9/6/23  Co-Morbidities : h/o lumbar sx  Reason for Therapy: Mobility Dysfunction and Discharge Planning      ASSESSMENT   Pt is a 77year old female admitted on 9/6/2023 for above sx. Functional outcome measures completed include [facility]. The AM-PAC '6-Clicks' Inpatient Basic Mobility Short Form was completed and this patient is demonstrating a Approx Degree of Impairment: 20.91%  degree of impairment in mobility. Research supports that patients with this level of impairment may benefit from return to home. PT Discharge Recommendations: Home      PLAN  Patient has been evaluated and presents with no skilled Physical Therapy needs at this time. Patient discharged from Physical Therapy services. Please re-order if a new functional limitation presents during this admission. GOALS  Patient was able to achieve the following goals . .. Patient was able to transfer Safely with RW   Patient able to ambulate on level surfaces Safely with RW         HOME SITUATION  Type of Home: House   Home Layout: One level                Lives With: Spouse  Drives: Yes  Patient Owned Equipment: Rolling walker       Prior Level of [place]: Pt reports ind PTA. Pt has RW that she borrowed from [employer]. SUBJECTIVE  \"I feel better with it. \" Re: RW      OBJECTIVE  Precautions: Spine;Cervical brace  Fall Risk: Standard fall risk    WEIGHT BEARING RESTRICTION                   PAIN ASSESSMENT  Rating: Unable to rate  Location: incisional;  posterior muscles  Management Techniques: Activity promotion; Body mechanics; Relaxation;Repositioning    COGNITION  Overall Cognitive Status:  WFL - within functional limits    RANGE OF MOTION AND STRENGTH ASSESSMENT  Upper extremity ROM and strength are within functional limits     Lower extremity ROM is within functional limits     Lower extremity strength is within functional limits       BALANCE  Static Sitting: Good  Dynamic Sitting: Good  Static Standing: Fair +  Dynamic Standing: Fair -    ADDITIONAL TESTS                                    ACTIVITY TOLERANCE                         O2 WALK       NEUROLOGICAL FINDINGS                        AM-PAC '6-Clicks' INPATIENT SHORT FORM - BASIC MOBILITY  How much difficulty does the patient currently have. .. Patient Difficulty: Turning over in bed (including adjusting bedclothes, sheets and blankets)?: None   Patient Difficulty: Sitting down on and standing up from a chair with arms (e.g., wheelchair, bedside commode, etc.): None   Patient Difficulty: Moving from lying on back to sitting on the side of the bed?: None   How much help from another person does the patient currently need. .. Help from Another: Moving to and from a bed to a chair (including a wheelchair)?: None   Help from Another: Need to walk in hospital room?: A Little   Help from Another: Climbing 3-5 steps with a railing?: A Little       AM-PAC Score:  Raw Score: 22   Approx Degree of Impairment: 20.91%   Standardized Score (AM-PAC Scale): 53.28   CMS Modifier (G-Code): CJ    FUNCTIONAL ABILITY STATUS  Gait Assessment   Functional Mobility/Gait Assessment  Gait Assistance: Supervision  Distance (ft): 115aec2  Assistive Device: Rolling walker  Pattern: Within Functional Limits  Stairs: Stoop/curb; Car transfer  Stoop/Curb: min A for RW to descend from 4 inch and 8 inch  Car transfer: supervision    Skilled Therapy Provided     Bed Mobility:  Rolling: mod ind  Supine to sit: mod ind   Sit to supine: mod ind     Transfer Mobility:  Sit to stand: mod ind   Stand to sit: mod ind  Gait = Ambulation as above. Therapist's comments:Pt educated in spine precautions- able to return demos well.     Exercise/Education Provided:  Bed mobility  Body mechanics  Functional activity tolerated  Gait training  Posture  Transfer training    Patient End of Session: Up in chair;Needs met;Call light within reach;RN aware of session/findings; All patient questions and concerns addressed; Family present    Patient Evaluation Complexity Level:  History Low - no personal factors and/or co-morbidities   Examination of body systems Low - addressing 1-2 elements   Clinical Presentation Low - Stable   Clinical Decision Making Low Complexity       PT Session Time: 20 minutes    Therapeutic Activity: 8 minutes

## 2023-09-07 NOTE — PLAN OF CARE
Ok to discharge home per neurosurgery and Dr. Patricia Pierson.            Problem: Patient/Family Goals  Goal: Patient/Family Long Term Goal  Description: Patient's Long Term Goal: Discharge with adequate resources    Interventions:  - Identify barriers to discharge w/pt and caregiver  - Include patient/family/discharge partner in discharge planning  - Arrange for needed discharge resources and transportation as appropriate  - Identify discharge learning needs   - Consider post-discharge preferences of patient/family/discharge partner  - Assess patient's ability to be responsible for managing their own health  - Refer to Case Management Department for coordinating discharge planning if the patient needs post-hospital services  - See additional Care Plan goals for specific interventions  9/7/2023 1503 by Lucie Chatman RN  Outcome: Adequate for Discharge  9/7/2023 1200 by Lucie Chatman RN  Outcome: Progressing  Goal: Patient/Family Short Term Goal  Description: Patient's Short Term Goal:  9/6: pain control    Interventions:   - medications per [facility]  - repositioning  - See additional Care Plan goals for specific interventions  9/7/2023 1503 by Lucie Chatman RN  Outcome: Adequate for Discharge  9/7/2023 1200 by Lucie Chatman RN  Outcome: Progressing     Problem: PAIN - ADULT  Goal: Verbalizes/displays adequate comfort level or patient's stated pain goal  Description: INTERVENTIONS:  - Encourage pt to monitor pain and request assistance  - Assess pain using appropriate pain scale  - Administer analgesics based on type and severity of pain and evaluate response  - Implement non-pharmacological measures as appropriate and evaluate response  - Consider cultural and social influences on pain and pain management  - Manage/alleviate anxiety  - Utilize distraction and/or relaxation techniques  - Monitor for opioid side effects  - Notify MD/LIP if interventions unsuccessful or patient reports new pain  - Anticipate increased pain with activity and pre-medicate as appropriate  9/7/2023 1503 by Pricilla Mar RN  Outcome: Adequate for Discharge  9/7/2023 1200 by Pricilla Mar RN  Outcome: Progressing     Problem: RISK FOR INFECTION - ADULT  Goal: Absence of fever/infection during anticipated neutropenic period  Description: INTERVENTIONS  - Monitor WBC  - Administer growth factors as ordered  - Implement neutropenic guidelines  9/7/2023 1503 by Pricilla Mar RN  Outcome: Adequate for Discharge  9/7/2023 1200 by Pricilla Mar RN  Outcome: Progressing     Problem: SAFETY ADULT - FALL  Goal: Free from fall injury  Description: INTERVENTIONS:  - Assess pt frequently for physical needs  - Identify cognitive and physical deficits and behaviors that affect risk of falls.   - [place] fall precautions as indicated by assessment.  - Educate pt/family on patient safety including physical limitations  - Instruct pt to call for assistance with activity based on assessment  - Modify environment to reduce risk of injury  - Provide assistive devices as appropriate  - Consider OT/PT consult to assist with strengthening/mobility  - Encourage toileting schedule  9/7/2023 1503 by Pricilla Mar RN  Outcome: Adequate for Discharge  9/7/2023 1200 by Pricilla Mar RN  Outcome: Progressing

## 2023-09-08 ENCOUNTER — TELEPHONE (OUTPATIENT)
Dept: SURGERY | Facility: CLINIC | Age: 68
End: 2023-09-08

## 2023-09-08 RX ORDER — TRAMADOL HYDROCHLORIDE 50 MG/1
TABLET ORAL EVERY 6 HOURS PRN
Qty: 60 TABLET | Refills: 0 | Status: SHIPPED | OUTPATIENT
Start: 2023-09-08

## 2023-09-08 RX ORDER — PREDNISONE 20 MG/1
20 TABLET ORAL DAILY
Qty: 3 TABLET | Refills: 0 | Status: SHIPPED | OUTPATIENT
Start: 2023-09-08 | End: 2023-09-11

## 2023-09-08 RX ORDER — DIAZEPAM 5 MG/1
5 TABLET ORAL EVERY 8 HOURS PRN
Qty: 30 TABLET | Refills: 0 | Status: SHIPPED | OUTPATIENT
Start: 2023-09-08

## 2023-09-08 NOTE — TELEPHONE ENCOUNTER
Pt calling to request a RX for methyl prednisone 7 day z-pack. States she is still in pain and is wondering if it would help because it did last time Dr. Nash Hair prescribed it.

## 2023-09-08 NOTE — TELEPHONE ENCOUNTER
Pt is requesting to receive a medrol dose pack to aid in the patient pain. Noted the patient underwent CERVICAL 4, CERVICAL 5, CERVICAL 6, PARTIAL CERVICAL 3 AND PARTIAL CERVICAL  7 LAMINECTOMIES, CERVICAL 4-CERVICAL 5, CERVICAL 5-CERVICAL 6 POSTERIOR SPINAL FUSION WITH INSTRUMENTATION, ALLOGRAFT, AUTOGRAFT with Dani on 9/6/2023    Noted Dr Krunal Osorio note on 9/7/2023:  \"Assessment/Plan:  77-year-old female status post posterior cervical laminectomy and fusion  She is doing great  Okay to discharge home  Instructions given\"    Noted pt is already prescribed medications:  - Cyclobenzaprine 5 mg \"Take 0.5-1 tablets (2.5-5 mg total) by mouth 3 (three) times daily as needed for Muscle spasms. \"    -acetaminophen-codeine (TYLENOL WITH CODEINE #3) 300-30 MG Oral Tab \"Take 1-2 tablets by mouth every 4 (four) hours as needed for Pain. \"    - gabapentin 100 MG Oral Cap \"Take 1 capsule (100 mg total) by mouth 3 (three) times daily. \"      Routed to the providers to inquire if a medrol dose pack is appropriate.

## 2023-09-21 ENCOUNTER — OFFICE VISIT (OUTPATIENT)
Dept: SURGERY | Facility: CLINIC | Age: 68
End: 2023-09-21
Payer: MEDICARE

## 2023-09-21 VITALS
SYSTOLIC BLOOD PRESSURE: 122 MMHG | BODY MASS INDEX: 33.12 KG/M2 | HEIGHT: 67 IN | DIASTOLIC BLOOD PRESSURE: 60 MMHG | WEIGHT: 211 LBS

## 2023-09-21 DIAGNOSIS — Z98.1 S/P CERVICAL SPINAL FUSION: Primary | ICD-10-CM

## 2023-09-21 PROBLEM — M25.662 STIFFNESS OF LEFT KNEE: Status: ACTIVE | Noted: 2018-11-25

## 2023-09-21 PROBLEM — M75.40 IMPINGEMENT SYNDROME OF SHOULDER REGION: Status: ACTIVE | Noted: 2019-02-21

## 2023-09-21 PROBLEM — M25.512 PAIN IN JOINT OF LEFT SHOULDER: Status: ACTIVE | Noted: 2019-08-02

## 2023-09-21 PROBLEM — E66.9 OBESITY WITH BODY MASS INDEX 30 OR GREATER: Status: ACTIVE | Noted: 2021-11-04

## 2023-09-21 PROCEDURE — 1111F DSCHRG MED/CURRENT MED MERGE: CPT | Performed by: NURSE PRACTITIONER

## 2023-09-21 PROCEDURE — 99024 POSTOP FOLLOW-UP VISIT: CPT | Performed by: NURSE PRACTITIONER

## 2023-09-21 RX ORDER — GABAPENTIN 100 MG/1
100 CAPSULE ORAL 3 TIMES DAILY
Qty: 90 CAPSULE | Refills: 0 | Status: SHIPPED | OUTPATIENT
Start: 2023-09-21

## 2023-09-21 NOTE — PROGRESS NOTES
[facility]   Outpatient Neurological Surgery Follow Up    Cassidy Turner  : 1955  PCP: Samara Austin MD  Referring Provider: No ref. provider found    REASON FOR VISIT:Patient presents with:  Post-Op: 2 weeks post op       HISTORY OF PRESENT ILLNESS:  Cassidy Turner is a 77year old female who presents today for  a 2 week post op visit. Pt states she has had improvement in her pre op headaches and tingling in her back. She has had improvement in her pre op arm pain as well. She reports post op she had increased weakness in her arms but she states this is starting to improve already. PAST MEDICAL HISTORY:  Past Medical History:   Diagnosis Date    Back problem     Diabetes ([address]     Diabetes mellitus ([address]     Difficult intubation     was informed that a \"glide\" scope should be used    Disorder of thyroid     Benign cyst to left thyroid    Esophageal reflux     Essential hypertension     High blood pressure     High cholesterol     HTN (hypertension)     Hx of motion sickness     Hypercholesterolemia     Neuropathy     Feet bilaterally    Osteoarthritis     PONV (postoperative nausea and vomiting)     Visual impairment     glasses/contacts       PAST SURGICAL HISTORY:  Past Surgical History:   Procedure Laterality Date          CARPAL TUNNEL RELEASE Bilateral     KNEE REPLACEMENT SURGERY Left     LAP ADJUSTABLE GASTRIC BAND      LAPAROSCOPIC CHOLECYSTECTOMY      OTHER SURGICAL HISTORY  2023    LUMBAR 4 AND LUMBAR 5 LAMINECTOMIES WITH MEDIAL FACETECTOMIES    ROTATOR CUFF REPAIR Bilateral        SOCIAL HISTORY:   reports that she has quit smoking. Her smoking use included cigarettes. She has never used smokeless tobacco. She reports that she does not currently use alcohol. She reports that she does not use drugs.       ALLERGIES:    Atorvastatin            MYALGIA  Rosuvastatin            MYALGIA    Comment:Purple bloches on stomach  Shellfish NAUSEA AND VOMITING, DIZZINESS  Irbesartan              UNKNOWN    Comment:Doesn't help blood pressure  Metoprolol              NAUSEA ONLY    Comment:Doesn't help blood pressure    MEDICATIONS:  Current Outpatient Medications   Medication Sig Dispense Refill    diazePAM 5 MG Oral Tab Take 1 tablet (5 mg total) by mouth every 8 (eight) hours as needed (spasms). 30 tablet 0    cyclobenzaprine 5 MG Oral Tab Take 0.5-1 tablets (2.5-5 mg total) by mouth 3 (three) times daily as needed for Muscle spasms. 30 tablet 0    acetaminophen-codeine (TYLENOL WITH CODEINE #3) 300-30 MG Oral Tab Take 1-2 tablets by mouth every 4 (four) hours as needed for Pain. 60 tablet 0    gabapentin 100 MG Oral Cap Take 1 capsule (100 mg total) by mouth 3 (three) times daily. 90 capsule 0    Cyanocobalamin (B-12 OR) Take 1 tablet by mouth daily. pioglitazone 30 MG Oral Tab Take 1 tablet (30 mg total) by mouth daily. glyBURIDE 5 MG Oral Tab Take 2 tablets (10 mg total) by mouth 2 (two) times daily with meals. Multiple Vitamins-Minerals (MULTI-VITAMIN/MINERALS) Oral Tab Take 1 tablet by mouth daily. Glucose Blood (ACCU-CHEK SMARTVIEW) In Vitro Strip 1 kit 2 (two) times daily. hydroCHLOROthiazide 25 MG Oral Tab Take 0.5 tablets (12.5 mg total) by mouth daily. valsartan 320 MG Oral Tab Take 1 tablet (320 mg total) by mouth daily. REVIEW OF SYSTEMS:  Pertinent positives and negatives are noted in HPI. PHYSICAL EXAMINATION:  VITAL SIGNS: There were no vitals taken for this visit. GENERAL:  Patient is a 77year old female in no apparent distress. HEENT:  Normocephalic, atraumatic. NEUROLOGICAL:  This patient is alert and orientated x 3. Speech fluent. Able to follow simple commands. CN II - XII intact. GARCIA x 4. Gait deferred  INTEGUMENTARY: Sutures prepped and removed.   Surgical incision healing well with slight blanchable erythema    IMAGING:  No new imaging to review    ASSESSMENT:  S/p C4-6, partial C3, 7 laminectomies, C5-6 posterior fusion    PLAN:  Continue lifting restrictions. Continue to wear Aspen collar when ambulating or sitting up supported   2. Gabapentin refilled as pt states this has helped  3. PT ordered but advised pt to continue weight restrictions or wait until follow up with Dr. Dc Bee before beginning  4. F/u with 6 week post op visit with Dr. Dc Bee. Cervical xrays ordered to be completed prior to appointment          Total time spent:  20 minutes  Greater than 50% of the time was spent on counseling/coordination of care.   Franco Paz of education / counseling: Pathology, treatment options, and expected outcomes    ROBBY Weiner  9/21/2023, 2:14 PM

## 2023-09-21 NOTE — PROGRESS NOTES
Established patient:  Reason for follow up: 2 week post op     Numeric Rating Scale: (No Pain) 0  to  10 (Worst Pain)        Pain at Present:  4/10       Distribution of Pain:        Most recent treatments for Current Pain Condition:   Surgery  Response to treatment: some relief    New imaging or testing since your last office visit:

## 2023-10-17 ENCOUNTER — HOSPITAL ENCOUNTER (OUTPATIENT)
Dept: GENERAL RADIOLOGY | Age: 68
Discharge: HOME | End: 2023-10-17
Attending: NURSE PRACTITIONER
Payer: MEDICARE

## 2023-10-17 DIAGNOSIS — Z98.1 S/P CERVICAL SPINAL FUSION: ICD-10-CM

## 2023-10-17 PROCEDURE — 72040 X-RAY EXAM NECK SPINE 2-3 VW: CPT | Performed by: NURSE PRACTITIONER

## 2023-10-19 ENCOUNTER — TELEPHONE (OUTPATIENT)
Dept: SURGERY | Facility: CLINIC | Age: 68
End: 2023-10-19

## 2023-10-19 ENCOUNTER — OFFICE VISIT (OUTPATIENT)
Dept: SURGERY | Facility: CLINIC | Age: 68
End: 2023-10-19
Payer: MEDICARE

## 2023-10-19 VITALS
HEART RATE: 80 BPM | DIASTOLIC BLOOD PRESSURE: 68 MMHG | SYSTOLIC BLOOD PRESSURE: 102 MMHG | WEIGHT: 211 LBS | BODY MASS INDEX: 33.12 KG/M2 | HEIGHT: 67 IN

## 2023-10-19 DIAGNOSIS — Z98.1 S/P CERVICAL SPINAL FUSION: Primary | ICD-10-CM

## 2023-10-19 PROCEDURE — 99024 POSTOP FOLLOW-UP VISIT: CPT | Performed by: NEUROLOGICAL SURGERY

## 2023-10-19 NOTE — TELEPHONE ENCOUNTER
Patient had CERVICAL 4, CERVICAL 5, CERVICAL 6, PARTIAL CERVICAL 3 AND PARTIAL CERVICAL  7 LAMINECTOMIES, CERVICAL 4-CERVICAL 5, CERVICAL 5-CERVICAL 6 POSTERIOR SPINAL FUSION WITH INSTRUMENTATION, ALLOGRAFT, AUTOGRAFT with Dr. Nash Hair on 9/6/23. Patient was in for follow up today and forgot to ask if she can do PT at 9 weeks out.

## 2023-10-19 NOTE — TELEPHONE ENCOUNTER
Pt forgot to ask, is it ok for her to do PT at 9 weeks. Please advise, Pt's best call back number is [phone]. Endorsed to RN for Provider.

## 2023-10-19 NOTE — PROGRESS NOTES
Established patient:  Reason for follow up:  6 week post op, sx 09/06/23    Numeric Rating Scale:         Pain at Present:  5/10       Distribution of Pain:arm pain     New imaging or testing since your last office visit:    XR cervical spine 10/17/23

## 2023-10-20 RX ORDER — GABAPENTIN 100 MG/1
200 CAPSULE ORAL 3 TIMES DAILY
Qty: 540 CAPSULE | Refills: 0 | Status: SHIPPED | OUTPATIENT
Start: 2023-10-20 | End: 2024-01-18

## 2023-10-20 NOTE — TELEPHONE ENCOUNTER
Patient has PT order and an appointment set up. Patient needs refill on Gabapentin. She takes 200mg TID. Patient states she needs a 90 day supply for insurance purposes. Please send to patient's [address]

## 2023-11-07 ENCOUNTER — OFFICE VISIT (OUTPATIENT)
Dept: PHYSICAL THERAPY | Facility: HOSPITAL | Age: 68
End: 2023-11-07
Attending: NURSE PRACTITIONER
Payer: MEDICARE

## 2023-11-07 DIAGNOSIS — Z98.1 S/P CERVICAL SPINAL FUSION: Primary | ICD-10-CM

## 2023-11-07 PROCEDURE — 97110 THERAPEUTIC EXERCISES: CPT | Performed by: PHYSICAL THERAPIST

## 2023-11-07 PROCEDURE — 97530 THERAPEUTIC ACTIVITIES: CPT | Performed by: PHYSICAL THERAPIST

## 2023-11-07 PROCEDURE — 97162 PT EVAL MOD COMPLEX 30 MIN: CPT | Performed by: PHYSICAL THERAPIST

## 2023-11-09 ENCOUNTER — OFFICE VISIT (OUTPATIENT)
Dept: PHYSICAL THERAPY | Facility: HOSPITAL | Age: 68
End: 2023-11-09
Attending: NURSE PRACTITIONER
Payer: MEDICARE

## 2023-11-09 PROCEDURE — 97110 THERAPEUTIC EXERCISES: CPT | Performed by: PHYSICAL THERAPIST

## 2023-11-09 PROCEDURE — 97140 MANUAL THERAPY 1/> REGIONS: CPT | Performed by: PHYSICAL THERAPIST

## 2023-11-09 PROCEDURE — 97530 THERAPEUTIC ACTIVITIES: CPT | Performed by: PHYSICAL THERAPIST

## 2023-11-09 NOTE — PROGRESS NOTES
2023  Dx:  S/P cervical spinal fusion (Z98.1)            Authorized # of Visits:  10 visits on POC          Next MD visit: none   Fall Risk: standard         Precautions:  cervical fusion 2023, lumbar fusion  Medication Changes since last visit?: No    Subjective: Reports she was gently laying down tile and almost feels better after she moves. States she wore the collar while doing that but is weaning out of the collar as the PA instructed. Pain Ratin/10 VAS    Objective:       2023  Visit #   1 2023  Visit #2   Manual Therapy   STM/MFR bilateral UT's    Gentle neural tension techniques   Therapeutic Exercise Gentle AROM cervical spine     Hand exercises R  Rubber bands  Gripping  Pinching     AROM - alternating   Bicep curls  Triceps extension     Walking program Self neural tension techniques    Adductor squeeze with exhalation    Anterior pec stretch in supine hooklying       Therapeutic Activity Education on centralization of symptoms and precautions for therapy     Education on precautions for lifting OH/weaning from brace     Education on avoiding falls Education on centralization of symptoms and precautions for therapy     Education on precautions for lifting OH/weaning from brace     Education on avoiding falls   Neuromuscular Education      TNE Education      HEP Gentle AROM cervical spine     Hand exercises R  Rubber bands  Gripping  Pinching     AROM - alternating   Bicep curls  Triceps extension     Walking program Self neural tension techniques    Adductor squeeze with exhalation    Anterior pec stretch in supine hooklying           Assessment: No adverse effects to treatment. The pt displays improved soft tissue mobility in the UT's bilaterally this date. Able to progress exercise program and updated HEP. Reviewed precautions. The pt reported understanding. Goals:       The pt was educated on the plan of care, purpose and individual goals for therapy, precautions for therapy. All questions were answered. 1.  The pt will be independent in their HEP. 2.  Centralization of symptoms to the cervical spine. 3.  The pt will be able to complete a modified workout program.  4.  The pt will display 5/5 strength in the R UE.  5.  The pt will be able to cook a meal without an increase in pain. 6.  The pt will report 75% decrease in symptoms. Frequency/Duration: Patient will be seen for 1-2 x/week or a total of 10 visits over a 90 day period. Treatment will include: Manual Therapy; Therapeutic Exercises; Neuromuscular Re-education; Therapeutic Activity; Patient education; Home exercise program instruction; TNE Education, Modalities as needed.      Education or treatment limitation: None  Rehab Potential: good    Charges: Man1 (15), TE1 (15), TA1 (8)       Total Timed Treatment: 38 min  Total Treatment Time: 38 min

## 2023-11-14 ENCOUNTER — OFFICE VISIT (OUTPATIENT)
Dept: PHYSICAL THERAPY | Facility: HOSPITAL | Age: 68
End: 2023-11-14
Attending: NURSE PRACTITIONER
Payer: MEDICARE

## 2023-11-14 PROCEDURE — 97110 THERAPEUTIC EXERCISES: CPT | Performed by: PHYSICAL THERAPIST

## 2023-11-14 PROCEDURE — 97140 MANUAL THERAPY 1/> REGIONS: CPT | Performed by: PHYSICAL THERAPIST

## 2023-11-14 NOTE — PROGRESS NOTES
2023    Dx:  S/P cervical spinal fusion (Z98.1)            Authorized # of Visits:  10 visits on POC          Next MD visit: none   Fall Risk: standard         Precautions:  cervical fusion 2023, lumbar fusion  Medication Changes since last visit?: No    Subjective: Reports she is doing her HEP everyday. States she has less pain with ROM. States her arm strength is improving. Doing 4 hours without the collar. States she raked for 3 hours and the grocery store for 2 hours and them cut up some meat after that. State she was very tired.       Pain Ratin/10 VAS    Objective:       2023  Visit #   1 2023  Visit #2 2023  Visit #3   Manual Therapy   STM/MFR bilateral UT's    Gentle neural tension techniques STM/MFR bilateral UT's    Gentle neural tension techniques   Therapeutic Exercise Gentle AROM cervical spine     Hand exercises R  Rubber bands  Gripping  Pinching     AROM - alternating   Bicep curls  Triceps extension     Walking program Self neural tension techniques    Adductor squeeze with exhalation    Anterior pec stretch in supine hooklying     Wrist flexion/extension with 2lbs    Wrist supination/pronation with 2 lbs weights    Scapular retraction    AROM to end range        Therapeutic Activity Education on centralization of symptoms and precautions for therapy     Education on precautions for lifting OH/weaning from brace     Education on avoiding falls Education on centralization of symptoms and precautions for therapy     Education on precautions for lifting OH/weaning from brace     Education on avoiding falls    Neuromuscular Education       TNE Education       HEP Gentle AROM cervical spine     Hand exercises R  Rubber bands  Gripping  Pinching     AROM - alternating   Bicep curls  Triceps extension     Walking program Self neural tension techniques    Adductor squeeze with exhalation    Anterior pec stretch in supine hooklying Wrist flexion/extension with 2lbs    Wrist supination/pronation with 2 lbs weights    Scapular retraction           Assessment: No adverse effects to treatment. The pt displays improved soft tissue mobility in the UT's bilaterally this date. Able to progress exercise program and updated HEP. Reviewed precautions. The pt is in able to be without her collar for 4 hours a day. Goals: The pt was educated on the plan of care, purpose and individual goals for therapy, precautions for therapy. All questions were answered. 1.  The pt will be independent in their HEP. 2.  Centralization of symptoms to the cervical spine. 3.  The pt will be able to complete a modified workout program.  4.  The pt will display 5/5 strength in the R UE.  5.  The pt will be able to cook a meal without an increase in pain. 6.  The pt will report 75% decrease in symptoms. Frequency/Duration: Patient will be seen for 1-2 x/week or a total of 10 visits over a 90 day period. Treatment will include: Manual Therapy; Therapeutic Exercises; Neuromuscular Re-education; Therapeutic Activity; Patient education; Home exercise program instruction; TNE Education, Modalities as needed.      Education or treatment limitation: None  Rehab Potential: good    Charges: Man2(23), TE1 (15)    Total Timed Treatment: 38 min  Total Treatment Time: 38 min

## 2023-11-16 ENCOUNTER — APPOINTMENT (OUTPATIENT)
Dept: PHYSICAL THERAPY | Facility: HOSPITAL | Age: 68
End: 2023-11-16
Attending: NURSE PRACTITIONER
Payer: MEDICARE

## 2023-11-21 ENCOUNTER — OFFICE VISIT (OUTPATIENT)
Dept: PHYSICAL THERAPY | Facility: HOSPITAL | Age: 68
End: 2023-11-21
Attending: NURSE PRACTITIONER
Payer: MEDICARE

## 2023-11-21 PROCEDURE — 97140 MANUAL THERAPY 1/> REGIONS: CPT | Performed by: PHYSICAL THERAPIST

## 2023-11-21 PROCEDURE — 97110 THERAPEUTIC EXERCISES: CPT | Performed by: PHYSICAL THERAPIST

## 2023-11-21 NOTE — PROGRESS NOTES
2023    Dx:  S/P cervical spinal fusion (Z98.1)            Authorized # of Visits:  10 visits on POC          Next MD visit: none   Fall Risk: standard         Precautions:  cervical fusion 2023, lumbar fusion  Medication Changes since last visit?: No    Subjective: Reports she is doing better - now able to put on mascara. Reports she walked 1.25 miles but developed some R piriformis pain after walking.     Pain Ratin/10 VAS    Objective:       2023  Visit #   1 2023  Visit #2 2023  Visit #3 2023  Visit #4   Manual Therapy   STM/MFR bilateral UT's    Gentle neural tension techniques STM/MFR bilateral UT's    Gentle neural tension techniques STM/MFR bilateral UT's    Therapeutic Exercise Gentle AROM cervical spine     Hand exercises R  Rubber bands  Gripping  Pinching     AROM - alternating   Bicep curls  Triceps extension     Walking program Self neural tension techniques    Adductor squeeze with exhalation    Anterior pec stretch in supine hooklying     Wrist flexion/extension with 2lbs    Wrist supination/pronation with 2 lbs weights    Scapular retraction    AROM to end range      Piriformis stretch    Adductor squeeze with exhalation   Therapeutic Activity Education on centralization of symptoms and precautions for therapy     Education on precautions for lifting OH/weaning from brace     Education on avoiding falls Education on centralization of symptoms and precautions for therapy     Education on precautions for lifting OH/weaning from brace     Education on avoiding falls     Neuromuscular Education     Trial of L SL knee, R glut max    Trial of L SL knee towards knee   TNE Education        HEP Gentle AROM cervical spine     Hand exercises R  Rubber bands  Gripping  Pinching     AROM - alternating   Bicep curls  Triceps extension     Walking program Self neural tension techniques    Adductor squeeze with exhalation    Anterior pec stretch in supine hooklying Wrist flexion/extension with 2lbs    Wrist supination/pronation with 2 lbs weights    Scapular retraction Continue with current HEP           Assessment: No adverse effects to treatment. The pt displays improved soft tissue mobility in the UT's bilaterally this date. The pt reports walking further but had some R LE pain. Advised to decrease the amount she was walking. Goals: The pt was educated on the plan of care, purpose and individual goals for therapy, precautions for therapy. All questions were answered. 1.  The pt will be independent in their HEP. 2.  Centralization of symptoms to the cervical spine. 3.  The pt will be able to complete a modified workout program.  4.  The pt will display 5/5 strength in the R UE.  5.  The pt will be able to cook a meal without an increase in pain. 6.  The pt will report 75% decrease in symptoms. Frequency/Duration: Patient will be seen for 1-2 x/week or a total of 10 visits over a 90 day period. Treatment will include: Manual Therapy; Therapeutic Exercises; Neuromuscular Re-education; Therapeutic Activity; Patient education; Home exercise program instruction; TNE Education, Modalities as needed.      Education or treatment limitation: None  Rehab Potential: good    Charges: Man2(23), TE1 (15)    Total Timed Treatment: 38 min  Total Treatment Time: 38 min

## 2023-11-24 ENCOUNTER — APPOINTMENT (OUTPATIENT)
Dept: PHYSICAL THERAPY | Facility: HOSPITAL | Age: 68
End: 2023-11-24
Attending: NURSE PRACTITIONER
Payer: MEDICARE

## 2023-11-28 ENCOUNTER — OFFICE VISIT (OUTPATIENT)
Dept: PHYSICAL THERAPY | Facility: HOSPITAL | Age: 68
End: 2023-11-28
Attending: NURSE PRACTITIONER
Payer: MEDICARE

## 2023-11-28 PROCEDURE — 97110 THERAPEUTIC EXERCISES: CPT | Performed by: PHYSICAL THERAPIST

## 2023-11-28 PROCEDURE — 97140 MANUAL THERAPY 1/> REGIONS: CPT | Performed by: PHYSICAL THERAPIST

## 2023-11-28 NOTE — PROGRESS NOTES
2023    Dx:  S/P cervical spinal fusion (Z98.1)            Authorized # of Visits:  10 visits on POC          Next MD visit: none   Fall Risk: standard         Precautions:  cervical fusion 2023, lumbar fusion  Medication Changes since last visit?: No    Subjective: Reports everything is getting slowly better  Reports she can walk further. States she vacuumed and washed two floors yesterday and felt good but states she is more sore/stiff today. Reports she can hold mascara. Reports she was able to make hats and scarfs. State her L UE still has pain. States her fine motor still have improved. Pain Ratin/10 VAS    Objective:       2023  Visit #3 2023  Visit #4 2023  Visit #5   Manual Therapy STM/MFR bilateral UT's    Gentle neural tension techniques STM/MFR bilateral UT's  STM/MFR cervical paraspinals, bilateral UT's and L shoulder/arm    Manual neutral tension techniques L arm   Therapeutic Exercise Wrist flexion/extension with 2lbs    Wrist supination/pronation with 2 lbs weights    Scapular retraction    AROM to end range      Piriformis stretch    Adductor squeeze with exhalation Cervical isometrics  With hand  With ball    Yes/no motion for DNF   Therapeutic Activity      Neuromuscular Education  Trial of L SL knee, R glut max    Trial of L SL knee towards knee    TNE Education      HEP Wrist flexion/extension with 2lbs    Wrist supination/pronation with 2 lbs weights    Scapular retraction Continue with current HEP Cervical isometrics  With hand  With ball    Yes/no motion for deep neck flexors           Assessment: No adverse effects to treatment. The pt displays improved soft tissue mobility in the UT's bilaterally this date. Added cervical isometrics and deep neck flexor activity for HEP. The pt reported less symptoms in the L UE after STM and neutral flossing of the R UE. Will give self neutral flossing for HEP at the next visit. Goals:       The pt was educated on the plan of care, purpose and individual goals for therapy, precautions for therapy. All questions were answered. 1.  The pt will be independent in their HEP. 2.  Centralization of symptoms to the cervical spine. 3.  The pt will be able to complete a modified workout program.  4.  The pt will display 5/5 strength in the R UE.  5.  The pt will be able to cook a meal without an increase in pain. 6.  The pt will report 75% decrease in symptoms. Frequency/Duration: Patient will be seen for 1-2 x/week or a total of 10 visits over a 90 day period. Treatment will include: Manual Therapy; Therapeutic Exercises; Neuromuscular Re-education; Therapeutic Activity; Patient education; Home exercise program instruction; TNE Education, Modalities as needed.      Education or treatment limitation: None  Rehab Potential: good    Charges: Man2(23), TE1 (15)    Total Timed Treatment: 38 min  Total Treatment Time: 38 min

## 2023-11-29 ENCOUNTER — TELEPHONE (OUTPATIENT)
Dept: PHYSICAL THERAPY | Facility: HOSPITAL | Age: 68
End: 2023-11-29

## 2023-11-30 ENCOUNTER — APPOINTMENT (OUTPATIENT)
Dept: PHYSICAL THERAPY | Facility: HOSPITAL | Age: 68
End: 2023-11-30
Attending: NURSE PRACTITIONER
Payer: MEDICARE

## 2023-12-05 ENCOUNTER — APPOINTMENT (OUTPATIENT)
Dept: PHYSICAL THERAPY | Facility: HOSPITAL | Age: 68
End: 2023-12-05
Attending: NURSE PRACTITIONER
Payer: MEDICARE

## 2023-12-07 ENCOUNTER — APPOINTMENT (OUTPATIENT)
Dept: PHYSICAL THERAPY | Facility: HOSPITAL | Age: 68
End: 2023-12-07
Attending: NURSE PRACTITIONER
Payer: MEDICARE

## 2023-12-11 ENCOUNTER — HOSPITAL ENCOUNTER (OUTPATIENT)
Dept: GENERAL RADIOLOGY | Age: 68
Discharge: HOME | End: 2023-12-11
Attending: NEUROLOGICAL SURGERY
Payer: MEDICARE

## 2023-12-11 DIAGNOSIS — Z98.1 S/P CERVICAL SPINAL FUSION: ICD-10-CM

## 2023-12-11 PROCEDURE — 72040 X-RAY EXAM NECK SPINE 2-3 VW: CPT | Performed by: NEUROLOGICAL SURGERY

## 2023-12-12 ENCOUNTER — APPOINTMENT (OUTPATIENT)
Dept: PHYSICAL THERAPY | Facility: HOSPITAL | Age: 68
End: 2023-12-12
Attending: NURSE PRACTITIONER
Payer: MEDICARE

## 2023-12-14 ENCOUNTER — OFFICE VISIT (OUTPATIENT)
Dept: SURGERY | Facility: CLINIC | Age: 68
End: 2023-12-14
Payer: MEDICARE

## 2023-12-14 ENCOUNTER — APPOINTMENT (OUTPATIENT)
Dept: PHYSICAL THERAPY | Facility: HOSPITAL | Age: 68
End: 2023-12-14
Attending: NURSE PRACTITIONER
Payer: MEDICARE

## 2023-12-14 VITALS
BODY MASS INDEX: 33.12 KG/M2 | HEART RATE: 69 BPM | WEIGHT: 211 LBS | HEIGHT: 67 IN | SYSTOLIC BLOOD PRESSURE: 126 MMHG | DIASTOLIC BLOOD PRESSURE: 80 MMHG

## 2023-12-14 DIAGNOSIS — Z98.1 S/P CERVICAL SPINAL FUSION: Primary | ICD-10-CM

## 2023-12-14 PROCEDURE — 99212 OFFICE O/P EST SF 10 MIN: CPT | Performed by: NEUROLOGICAL SURGERY

## 2023-12-14 NOTE — PROGRESS NOTES
Established patient:  Reason for follow up:  3 month post op, sx 09/06/23    Numeric Rating Scale:        Pain at Present:1/10- pain across shoulders     New imaging or testing since your last office visit:    XR cervical spine 12/11/23

## 2024-01-09 ENCOUNTER — APPOINTMENT (OUTPATIENT)
Dept: PHYSICAL THERAPY | Facility: HOSPITAL | Age: 69
End: 2024-01-09
Attending: NURSE PRACTITIONER
Payer: MEDICARE

## 2024-01-16 ENCOUNTER — OFFICE VISIT (OUTPATIENT)
Dept: PHYSICAL THERAPY | Facility: HOSPITAL | Age: 69
End: 2024-01-16
Attending: NURSE PRACTITIONER
Payer: MEDICARE

## 2024-01-16 PROCEDURE — 97140 MANUAL THERAPY 1/> REGIONS: CPT | Performed by: PHYSICAL THERAPIST

## 2024-01-16 PROCEDURE — 97112 NEUROMUSCULAR REEDUCATION: CPT | Performed by: PHYSICAL THERAPIST

## 2024-01-16 PROCEDURE — 97110 THERAPEUTIC EXERCISES: CPT | Performed by: PHYSICAL THERAPIST

## 2024-01-16 NOTE — PROGRESS NOTES
2024  Dx:  S/P cervical spinal fusion (Z98.1)            Authorized # of Visits:  10 visits on POC          Next MD visit: none   Fall Risk: standard         Precautions:  cervical fusion 2023, lumbar fusion  Medication Changes since last visit?: No    Subjective: Reports every thing is getting better.  States her  strength is still a little limited and she doesn't have OH strength.  Reports she was cleared for everything in Dec by the neurosurgeon.  L shoulder is still sore.  Brought a TM but doesn't have it yet.  Also has RB.      Pain Ratin-3/10 VAS    Objective:       2023  Visit #3 2023  Visit #4 2023  Visit #5 2024  Visit #6   Manual Therapy STM/MFR bilateral UT's    Gentle neural tension techniques STM/MFR bilateral UT's  STM/MFR cervical paraspinals, bilateral UT's and L shoulder/arm    Manual neutral tension techniques L arm STM bilateral UT's and cervical paraspinals   Therapeutic Exercise Wrist flexion/extension with 2lbs    Wrist supination/pronation with 2 lbs weights    Scapular retraction    AROM to end range      Piriformis stretch    Adductor squeeze with exhalation Cervical isometrics  With hand  With ball    Yes/no motion for DNF Rows    Triceps extension - alternating    Alternating biceps curls    Shoulder extension in standing    Bilateral shoulder ER   Therapeutic Activity       Neuromuscular Education  Trial of L SL knee, R glut max    Trial of L SL knee towards knee  R lat dorsi inhibition    Anterior neck inhibition with neutral tension stretching    PME holding door handles       TNE Education       HEP Wrist flexion/extension with 2lbs    Wrist supination/pronation with 2 lbs weights    Scapular retraction Continue with current HEP Cervical isometrics  With hand  With ball    Yes/no motion for deep neck flexors R lat dorsi inhibition    Anterior neck inhibition with neutral tension stretching    PME holding door handles           Assessment: No  adverse effects to treatment.  The pt returns to therapy now that her precautions were lifted.  She now displays functional cervical ROM and improving UE strength but reports limitations with OH movement and OH lifting.  She was given more strengthening exercises to perform and advised to slowly progress OH activity.  She will benefit from continued PT.         Goals:      The pt was educated on the plan of care, purpose and individual goals for therapy, precautions for therapy.  All questions were answered.      1.  The pt will be independent in their HEP.  2.  Centralization of symptoms to the cervical spine.  3.  The pt will be able to complete a modified workout program.  4.  The pt will display 5/5 strength in the R UE.  5.  The pt will be able to cook a meal without an increase in pain.  6.  The pt will report 75% decrease in symptoms.        Frequency/Duration: Patient will be seen for 1-2 x/week or a total of 10 visits over a 90 day period. Treatment will include: Manual Therapy; Therapeutic Exercises; Neuromuscular Re-education; Therapeutic Activity; Patient education; Home exercise program instruction; TNE Education, Modalities as needed.     Education or treatment limitation: None  Rehab Potential: good    Charges: Man1 (13), TE1 (15), NM1 (15)   Total Timed Treatment: 43min  Total Treatment Time: 43 min

## 2024-01-22 ENCOUNTER — TELEPHONE (OUTPATIENT)
Dept: PHYSICAL THERAPY | Facility: HOSPITAL | Age: 69
End: 2024-01-22

## 2024-01-22 NOTE — TELEPHONE ENCOUNTER
Left message requesting the patient to move to 12:45 so that patient can move to 2:15 for 1/23.  The pt can keep her currently scheduled appointment if 12:45 doesn't work for you.

## 2024-01-23 ENCOUNTER — APPOINTMENT (OUTPATIENT)
Dept: PHYSICAL THERAPY | Facility: HOSPITAL | Age: 69
End: 2024-01-23
Attending: NURSE PRACTITIONER
Payer: MEDICARE

## 2024-01-30 ENCOUNTER — OFFICE VISIT (OUTPATIENT)
Dept: PHYSICAL THERAPY | Facility: HOSPITAL | Age: 69
End: 2024-01-30
Attending: NURSE PRACTITIONER
Payer: MEDICARE

## 2024-01-30 PROCEDURE — 97140 MANUAL THERAPY 1/> REGIONS: CPT | Performed by: PHYSICAL THERAPIST

## 2024-01-30 PROCEDURE — 97110 THERAPEUTIC EXERCISES: CPT | Performed by: PHYSICAL THERAPIST

## 2024-01-30 NOTE — PROGRESS NOTES
2024    Dx:  S/P cervical spinal fusion (Z98.1)            Authorized # of Visits:  10 visits on POC          Next MD visit: none   Fall Risk: standard         Precautions:  cervical fusion 2023, lumbar fusion  Medication Changes since last visit?: No    Subjective: Reports every thing is getting better.  States the pain in her R mid back was gone with the last exercises from the previous visit.  Reports she did fall but didn't hurt her neck.  Reports she fell because of her bifocals. States she can't look down well enough to use the correct portion of her glasses.  Pain Ratin-3/10 VAS    Objective:       2023  Visit #3 2023  Visit #4 2023  Visit #5 2024  Visit #6 2024  Visit #7   Manual Therapy STM/MFR bilateral UT's    Gentle neural tension techniques STM/MFR bilateral UT's  STM/MFR cervical paraspinals, bilateral UT's and L shoulder/arm    Manual neutral tension techniques L arm STM bilateral UT's and cervical paraspinals STM/MFR cervical paraspinals, bilateral UT's and L shoulder/arm    Manual neutral tension techniques L arm   Therapeutic Exercise Wrist flexion/extension with 2lbs    Wrist supination/pronation with 2 lbs weights    Scapular retraction    AROM to end range      Piriformis stretch    Adductor squeeze with exhalation Cervical isometrics  With hand  With ball    Yes/no motion for DNF Rows    Triceps extension - alternating    Alternating biceps curls    Shoulder extension in standing    Bilateral shoulder ER Shoulder extension    Shoulders rows    Scapular retraction    Shoulder IR    Shoulder ER    Review of previous HEP and to decrease UT use during exercise   Therapeutic Activity        Neuromuscular Education  Trial of L SL knee, R glut max    Trial of L SL knee towards knee  R lat dorsi inhibition    Anterior neck inhibition with neutral tension stretching    PME holding door handles        TNE Education        HEP Wrist flexion/extension with  2lbs    Wrist supination/pronation with 2 lbs weights    Scapular retraction Continue with current HEP Cervical isometrics  With hand  With ball    Yes/no motion for deep neck flexors R lat dorsi inhibition    Anterior neck inhibition with neutral tension stretching    PME holding door handles Shoulder extension    Shoulders rows    Scapular retraction    Shoulder IR    Shoulder ER       Assessment: No adverse effects to treatment.  The pt's HEP was progressed with more ankur-scapular and RC strengthening.  Note significant improvement in cervical ROM during conversation and pain decreasing and full PROM/AROM of the L shoulder.  The pt will return in 1 month to monitor progress.         Goals:      The pt was educated on the plan of care, purpose and individual goals for therapy, precautions for therapy.  All questions were answered.      1.  The pt will be independent in their HEP.  2.  Centralization of symptoms to the cervical spine.  3.  The pt will be able to complete a modified workout program.  4.  The pt will display 5/5 strength in the R UE.  5.  The pt will be able to cook a meal without an increase in pain.  6.  The pt will report 75% decrease in symptoms.        Frequency/Duration: Patient will be seen for 1-2 x/week or a total of 10 visits over a 90 day period. Treatment will include: Manual Therapy; Therapeutic Exercises; Neuromuscular Re-education; Therapeutic Activity; Patient education; Home exercise program instruction; TNE Education, Modalities as needed.     Education or treatment limitation: None  Rehab Potential: good    Charges: Man1(15), TE2 (28) Total Timed Treatment: 43min  Total Treatment Time: 43 min

## 2024-02-07 ENCOUNTER — TELEPHONE (OUTPATIENT)
Dept: SURGERY | Facility: CLINIC | Age: 69
End: 2024-02-07

## 2024-02-07 RX ORDER — GABAPENTIN 100 MG/1
100 CAPSULE ORAL 3 TIMES DAILY
Qty: 90 CAPSULE | Refills: 0 | Status: SHIPPED | OUTPATIENT
Start: 2024-02-07 | End: 2024-02-08

## 2024-02-07 NOTE — TELEPHONE ENCOUNTER
Pt called to request prescription refill for     gabapentin 100 MG Oral Cap     [facility] [address] [place] [address] - [address] [place] [address] [phone], [phone]

## 2024-02-07 NOTE — TELEPHONE ENCOUNTER
Medication: gabapentin 100 mg      Date of last refill: 10/20/2023   [place]     Last office visit: 12/14/2023  Due back to clinic per last office note:  3 months   Date next office visit scheduled:    Future Appointments   Date Time Provider Department Center   2/22/2024  2:15 PM Madison Nieves, PT [facility] NEURO PT EM [facility]   3/14/2024  1:00 PM Josesito Louise MD [facility]2 [facility]           Last OV note recommendation:  \"ASSESSMENT and PLAN:  68-year-old female status post cervical laminectomy and fusion  She is doing great  Will see her back in 3 months with repeat x-rays  She is making great progress  All questions were answered  Patient and  appreciative\"      Called pt to inquire about the refill of gabapentin since it was not refilled since 10/2023    Pt states:  - She had last received 3 months of the medication   - Pt denies any new symptoms or exacerbation in symptoms   - Nerve pain is getting better but still needs the gabapentin.

## 2024-02-08 ENCOUNTER — APPOINTMENT (OUTPATIENT)
Dept: PHYSICAL THERAPY | Facility: HOSPITAL | Age: 69
End: 2024-02-08
Payer: MEDICARE

## 2024-02-08 RX ORDER — GABAPENTIN 100 MG/1
200 CAPSULE ORAL 3 TIMES DAILY
Qty: 90 CAPSULE | Refills: 0 | Status: SHIPPED | OUTPATIENT
Start: 2024-02-08

## 2024-02-08 NOTE — TELEPHONE ENCOUNTER
Pt calling stating she picked up script but was for old dosage from Sept, not dosage from script in Oct.pt states she takes  2 100 mg pills 3x a day so script she filled will only last 15 days. Please call to adjust.     gabapentin 100 MG Oral Cap () 540 capsule 0 10/20/2023 2024   Sig:   Take 2 capsules (200 mg total) by mouth 3 (three) times daily.     Route:   Oral     Order #:   [id number]

## 2024-02-15 ENCOUNTER — APPOINTMENT (OUTPATIENT)
Dept: PHYSICAL THERAPY | Facility: HOSPITAL | Age: 69
End: 2024-02-15
Payer: MEDICARE

## 2024-02-15 ENCOUNTER — TELEPHONE (OUTPATIENT)
Dept: SURGERY | Facility: CLINIC | Age: 69
End: 2024-02-15

## 2024-02-15 NOTE — TELEPHONE ENCOUNTER
I would recommend pt discuss with her PCP.  That is not a common side effect of weaning off Gabapentin but anything is possible

## 2024-02-15 NOTE — TELEPHONE ENCOUNTER
Patient is having issues with rx she went off of that Dr Louise prescribed and her blood pressure went up(gabapentin)

## 2024-02-15 NOTE — TELEPHONE ENCOUNTER
Noted the provider message listed below.     Sent the patient a [employer] message to inform.

## 2024-02-15 NOTE — TELEPHONE ENCOUNTER
Noted the patient message listed below.     Noted the patient LOV on 2/14/2023 with Dr. Louise:  \"ASSESSMENT and PLAN:  68-year-old female status post cervical laminectomy and fusion  She is doing great  Will see her back in 3 months with repeat x-rays  She is making great progress  All questions were answered  Patient and  appreciative\"    Noted the patient requested a refill for gabapentin on 2/7/2024     Called the patient to inquire further.  Pt states:  - pt b/p has spiked dramatically in the last 3 days   - pt stopped the gabapentin on Monday to try to get off of the gabapentin.     - BP is spiking 180/91    - pt is inquiring if he increase in BP is due to gabapentin withdrawal.   Pt states she also tried to new BP medications this week as well and is unsure what is causing her raise in BP    Advised the patient to proceed to the ED for immediate evaluation and treatment of BP. Pt inquired if she should call another provider first. Advised pt that she may call cardiologist or PCP but if the BP remains that quynh to proceed to the ED to stabilize BP.    Pt acknowledged and is appreciative of feedback

## 2024-02-22 ENCOUNTER — APPOINTMENT (OUTPATIENT)
Dept: PHYSICAL THERAPY | Facility: HOSPITAL | Age: 69
End: 2024-02-22
Payer: MEDICARE

## 2024-02-26 ENCOUNTER — PATIENT MESSAGE (OUTPATIENT)
Dept: SURGERY | Facility: CLINIC | Age: 69
End: 2024-02-26

## 2024-02-26 ENCOUNTER — HOSPITAL ENCOUNTER (OUTPATIENT)
Dept: GENERAL RADIOLOGY | Age: 69
Discharge: HOME | End: 2024-02-26
Attending: NEUROLOGICAL SURGERY
Payer: MEDICARE

## 2024-02-26 DIAGNOSIS — Z98.1 S/P CERVICAL SPINAL FUSION: ICD-10-CM

## 2024-02-26 PROCEDURE — 72040 X-RAY EXAM NECK SPINE 2-3 VW: CPT | Performed by: NEUROLOGICAL SURGERY

## 2024-02-26 NOTE — TELEPHONE ENCOUNTER
Patient replied, acknowledging feedback and sent a message of thanks, reporting that she will see PCP today.

## 2024-02-26 NOTE — TELEPHONE ENCOUNTER
We can order xrays to evaluate her hardware but the shaking and blood pressure are not related to her surgery and she needs to discuss this with her PCP.  Dr. Louise has no input as to what is causing this

## 2024-02-26 NOTE — TELEPHONE ENCOUNTER
Feedback noted and message reply sent, informing patient that Dr. Louise and ROBBY Haider recommend she follow up with PCP.  Today's imaging was reviewed by providers.

## 2024-02-26 NOTE — TELEPHONE ENCOUNTER
Noted that patient has messaged with an update stating:    -she is experiencing symptoms involving increase in blood pressure and \"severe shaking\".  -shaking begins in right leg, right back and radiates up to shoulders and arms.   -she fell on 1.23.24 but had no symptoms after fall.   -she stopped Gabapentin and resumed last week after symptoms have started. She is taking it BID.   -shaking \"comes and goes\", starting at 0300.   -last Gabapentin dose is taken at 1100 nightly.   -she obtained X-rays.   -she would like feedback prior to her next office visit:    3/14/2024 1:00 PM Josesito Louise MD     Noted that patient has been going to PT regularly. Swathi underwent Surgery with Dr. Louise on 9.6.23:    CERVICAL 4, CERVICAL 5, CERVICAL 6, PARTIAL CERVICAL 3 AND PARTIAL CERVICAL  7 LAMINECTOMIES, CERVICAL 4-CERVICAL 5, CERVICAL 5-CERVICAL 6 POSTERIOR SPINAL FUSION     C-spine X-rays were performed today.     At LOV on 10.19.23 with Dr. Louise:    \"ASSESSMENT and PLAN:  68-year-old female status post cervical laminectomy and fusion  She is doing great  Discussed collar in detail  She would like to wean a little early  We discussed that is okay  We will see her back in 8 weeks with repeat x-rays\"    Routed to ROBBY Walsh and JAKE peterson.

## 2024-02-26 NOTE — TELEPHONE ENCOUNTER
From: Doreen Bang  To: Josesito Louise  Sent: 2/26/2024 11:35 AM CST  Subject: shaking/blooc pressure    I am having spells of severe shaking. it starts in my right leg and back and works its way up to my shoulders and arms. When this happens my face gets red and hot and my blood pressure spikes badly.     I had fallen on my face Jan 23 but had no neck pain or other symptoms..     I had stopped the gabapentin on Feb 12 and that is when this started. I went back on gabapentin 2x day last week and today I went back on my full 3x a day dose.     The shaking comes and goes. One day it will be better and the next it will go on all day. It starts about 3 am. I take the gabapentin at 11pm     I went for the xrays to see if something had changed that could cause this. Please don't wait till my March appointment to look at them and decide. I can come in any time  Thank you

## 2024-02-29 ENCOUNTER — APPOINTMENT (OUTPATIENT)
Dept: PHYSICAL THERAPY | Facility: HOSPITAL | Age: 69
End: 2024-02-29
Payer: MEDICARE

## 2024-02-29 ENCOUNTER — APPOINTMENT (OUTPATIENT)
Dept: PHYSICAL THERAPY | Facility: HOSPITAL | Age: 69
End: 2024-02-29
Attending: INTERNAL MEDICINE
Payer: MEDICARE

## 2024-03-14 ENCOUNTER — OFFICE VISIT (OUTPATIENT)
Dept: SURGERY | Facility: CLINIC | Age: 69
End: 2024-03-14
Payer: MEDICARE

## 2024-03-14 VITALS — HEART RATE: 70 BPM | SYSTOLIC BLOOD PRESSURE: 146 MMHG | DIASTOLIC BLOOD PRESSURE: 82 MMHG | OXYGEN SATURATION: 99 %

## 2024-03-14 DIAGNOSIS — G95.9 CERVICAL MYELOPATHY (HCC): Primary | ICD-10-CM

## 2024-03-14 DIAGNOSIS — Z98.1 S/P CERVICAL SPINAL FUSION: ICD-10-CM

## 2024-03-14 PROCEDURE — 99214 OFFICE O/P EST MOD 30 MIN: CPT | Performed by: NEUROLOGICAL SURGERY

## 2024-03-14 RX ORDER — AMLODIPINE BESYLATE 5 MG/1
1 TABLET ORAL DAILY
COMMUNITY

## 2024-03-14 NOTE — PROGRESS NOTES
Patient is here today for follow up status post cervical laminectomy and fusion 2023 and review recent imaging        [facility]--XR Cervical Spine

## 2024-03-14 NOTE — PROGRESS NOTES
Neurosurgery Clinic Visit  3/14/2024    Doreen Bang PCP:  FROY MEHTA MD    1955 MRN [id number]     HISTORY OF PRESENT ILLNESS:  Doreen Bnag is a(n) 69 year old female here for 6-month follow-up status post posterior cervical laminectomy and fusion  From a surgical standpoint she is been doing well  Her arms are doing great  She does note that recently started having a vibration sense in her leg goes up her leg upper back upper chest and outer arms  Then it settles down after that she also gets this pressure up in her head  She is also having pressure in the back of her head just at the base of her skull  She is little bit of crunching in her neck        PHYSICAL EXAMINATION:  Vital Signs:  /82   Pulse 70   SpO2 99%   Awake alert, orient x 3  Follows commands x 4  Incision well-healed      REVIEW OF STUDIES:    X-rays look good      ASSESSMENT and PLAN:  69-year-old female status post posterior cervical laminectomy and fusion  She is doing well from surgical standpoint  There is a report of possibly a screw being malaligned but this actually looks better on this imaging other imaging and she has had no radiculopathy from a misaligned screw and screws are been stable  She was thankful to hear this  From a surgical standpoint things look great she had great improvement in her arms her arms working much better she is very happy  I can explain these episodes she is having  She has an appointment with endocrinology as well as neurology  I agree with that  I will see her back in 6 months with repeat x-rays  She will call with any questions or concerns  Reviewed films in detail  Patient appreciative        Time spent on counseling/coordination of care:  30 Minutes    Total time spent with patient:  30 Minutes      Josesito Louise MD   [facility]  3/14/2024  1:46 PM   Dictated but not proofread

## 2024-03-14 NOTE — PATIENT INSTRUCTIONS
Refill policies:    Allow 2-3 business days for refills; controlled substances may take longer.  Contact your pharmacy at least 5 days prior to running out of medication and have them send an electronic request or submit request through the “request refill” option in your [employer] account.  Refills are not addressed on weekends; covering physicians do not authorize routine medications on weekends.  No narcotics or controlled substances are refilled after noon on Fridays or by on call physicians.  By law, narcotics must be electronically prescribed.  A 30 day supply with no refills is the maximum allowed.  If your prescription is due for a refill, you may be due for a follow up appointment.  To best provide you care, patients receiving routine medications need to be seen at least once a year.  Patients receiving narcotic/controlled substance medications need to be seen at least once every 3 months.  In the event that your preferred pharmacy does not have the requested medication in stock (e.g. Backordered), it is your responsibility to find another pharmacy that has the requested medication available.  We will gladly send a new prescription to that pharmacy at your request.    Scheduling Tests:    If your physician has ordered radiology tests such as MRI or CT scans, please contact Central Scheduling at [phone] right away to schedule the test.  Once scheduled, the [facility] Centralized Referral Team will work with your insurance carrier to obtain pre-certification or prior authorization.  Depending on your insurance carrier, approval may take 3-10 days.  It is highly recommended patients assure they have received an authorization before having a test performed.  If test is done without insurance authorization, patient may be responsible for the entire amount billed.      Precertification and Prior Authorizations:  If your physician has recommended that you have a procedure or additional testing performed the [facility]  Centralized Referral Team will contact your insurance carrier to obtain pre-certification or prior authorization.    You are strongly encouraged to contact your insurance carrier to verify that your procedure/test has been approved and is a COVERED benefit.  Although the [facility] Centralized Referral Team does its due diligence, the insurance carrier gives the disclaimer that \"Although the procedure is authorized, this does not guarantee payment.\"    Ultimately the patient is responsible for payment.   Thank you for your understanding in this matter.  Paperwork Completion:  If you require FMLA or disability paperwork for your recovery, please make sure to either drop it off or have it faxed to our office at [phone]. Be sure the form has your name and date of birth on it.  The form will be faxed to our Forms Department and they will complete it for you.  There is a 25$ fee for all forms that need to be filled out.  Please be aware there is a 10-14 day turnaround time.  You will need to sign a release of information (NIKA) form if your paperwork does not come with one.  You may call the Forms Department with any questions at [phone].  Their fax number is [phone].

## 2024-03-15 RX ORDER — GABAPENTIN 100 MG/1
200 CAPSULE ORAL 3 TIMES DAILY
Qty: 180 CAPSULE | Refills: 0 | Status: SHIPPED | OUTPATIENT
Start: 2024-03-15

## 2024-03-15 NOTE — TELEPHONE ENCOUNTER
Medication:   gabapentin 100 MG Oral Cap 90 capsule 0 2/8/2024 --   Sig:   Take 2 capsules (200 mg total) by mouth 3 (three) times daily.          Date of last refill: 2.8.24 (#90/0)  [place]      Last office visit: 3.14.24  Due back to clinic per last office note:    Date next office visit scheduled:  9.12.24  Future Appointments   Date Time Provider Department Center   4/18/2024 11:00 AM Kosta Hood MD [facility]   9/12/2024  2:00 PM Josesito Louise MD ENINAPER2 [facility]           Last OV note recommendation:       \" ASSESSMENT and PLAN:  69-year-old female status post posterior cervical laminectomy and fusion  She is doing well from surgical standpoint  There is a report of possibly a screw being malaligned but this actually looks better on this imaging other imaging and she has had no radiculopathy from a misaligned screw and screws are been stable  She was thankful to hear this  From a surgical standpoint things look great she had great improvement in her arms her arms working much better she is very happy  I can explain these episodes she is having  She has an appointment with endocrinology as well as neurology  I agree with that  I will see her back in 6 months with repeat x-rays  She will call with any questions or concerns  Reviewed films in detail  Patient appreciative\"

## 2024-05-06 ENCOUNTER — OFFICE VISIT (OUTPATIENT)
Dept: NEUROLOGY | Facility: CLINIC | Age: 69
End: 2024-05-06
Payer: MEDICARE

## 2024-05-06 VITALS
BODY MASS INDEX: 34 KG/M2 | WEIGHT: 216.81 LBS | SYSTOLIC BLOOD PRESSURE: 128 MMHG | HEART RATE: 83 BPM | RESPIRATION RATE: 16 BRPM | OXYGEN SATURATION: 99 % | DIASTOLIC BLOOD PRESSURE: 74 MMHG

## 2024-05-06 DIAGNOSIS — R25.1 TREMOR: Primary | ICD-10-CM

## 2024-05-06 PROCEDURE — 99204 OFFICE O/P NEW MOD 45 MIN: CPT | Performed by: OTHER

## 2024-05-06 NOTE — PROGRESS NOTES
Right leg tremor like sensation. Patient states symptoms started in 02/2024. Patient states tremor sensation occurs on and off. Patient states tingling sensation in the leg. Denies burning sensation.

## 2024-05-06 NOTE — PATIENT INSTRUCTIONS
Refill policies:    Allow 2-3 business days for refills; controlled substances may take longer.  Contact your pharmacy at least 5 days prior to running out of medication and have them send an electronic request or submit request through the “request refill” option in your [employer] account.  Refills are not addressed on weekends; covering physicians do not authorize routine medications on weekends.  No narcotics or controlled substances are refilled after noon on Fridays or by on call physicians.  By law, narcotics must be electronically prescribed.  A 30 day supply with no refills is the maximum allowed.  If your prescription is due for a refill, you may be due for a follow up appointment.  To best provide you care, patients receiving routine medications need to be seen at least once a year.  Patients receiving narcotic/controlled substance medications need to be seen at least once every 3 months.  In the event that your preferred pharmacy does not have the requested medication in stock (e.g. Backordered), it is your responsibility to find another pharmacy that has the requested medication available.  We will gladly send a new prescription to that pharmacy at your request.    Scheduling Tests:    If your physician has ordered radiology tests such as MRI or CT scans, please contact Central Scheduling at [phone] right away to schedule the test.  Once scheduled, the [facility] Centralized Referral Team will work with your insurance carrier to obtain pre-certification or prior authorization.  Depending on your insurance carrier, approval may take 3-10 days.  It is highly recommended patients assure they have received an authorization before having a test performed.  If test is done without insurance authorization, patient may be responsible for the entire amount billed.      Precertification and Prior Authorizations:  If your physician has recommended that you have a procedure or additional testing performed the [facility]  Centralized Referral Team will contact your insurance carrier to obtain pre-certification or prior authorization.    You are strongly encouraged to contact your insurance carrier to verify that your procedure/test has been approved and is a COVERED benefit.  Although the [facility] Centralized Referral Team does its due diligence, the insurance carrier gives the disclaimer that \"Although the procedure is authorized, this does not guarantee payment.\"    Ultimately the patient is responsible for payment.   Thank you for your understanding in this matter.  Paperwork Completion:  If you require FMLA or disability paperwork for your recovery, please make sure to either drop it off or have it faxed to our office at [phone]. Be sure the form has your name and date of birth on it.  The form will be faxed to our Forms Department and they will complete it for you.  There is a 25$ fee for all forms that need to be filled out.  Please be aware there is a 10-14 day turnaround time.  You will need to sign a release of information (NIKA) form if your paperwork does not come with one.  You may call the Forms Department with any questions at [phone].  Their fax number is [phone].

## 2024-05-06 NOTE — H&P
Neurology H&P    Doreen Bang Patient Status:  No patient class for patient encounter    1955 MRN [id number]   Location [facility] Attending No att. providers found   Hosp Day # 0 PCP FROY MEHTA MD     Subjective:  Doreen Bang is a(n) 69 year old female with a PMH significant for HTN, cervical posterior laminectomy and fusion, DM-II CTS and more. She comes to the neurology clinic for several symtpoms. She states that she was taking gabapentin 100mg TID. She tried to stop this in February. She states that after stopping this she started to have high blood pressure, high blood surgars, and would have whole body shaking. She states that it felt like her body was moving. This was evident to others around her. She restarted gabapentin and this seemed to help. She state that she ws exhausted form this shaking. It was so severe that she was physically exhausted. She states that she was shaking over her whole body for 6 weeks. She had no LOC or AMS> She states that this shaking sensation would build up and then \"explode\" She states that she felt like she had to get rid of her energy and that if rode an exercise bike the shaking would go away and her BP would normalize. She states that these symptoms are still present. She states that she is shaking right now today. This is not visable. She staes that it feels like her body is vibrating. She has no weakness. She has no pain. She has no h/o seizures.      Current Medications:  Current Outpatient Medications   Medication Sig Dispense Refill    gabapentin 100 MG Oral Cap Take 2 capsules (200 mg total) by mouth 3 (three) times daily. 180 capsule 0    amLODIPine 5 MG Oral Tab Take 1 tablet (5 mg total) by mouth daily.      pioglitazone 30 MG Oral Tab Take 1 tablet (30 mg total) by mouth daily.      glyBURIDE 5 MG Oral Tab Take 2 tablets (10 mg total) by mouth 2 (two) times daily with meals.      Multiple  Vitamins-Minerals (MULTI-VITAMIN/MINERALS) Oral Tab Take 1 tablet by mouth daily.      Glucose Blood (ACCU-CHEK SMARTVIEW) In Vitro Strip 1 kit 2 (two) times daily.      hydroCHLOROthiazide 25 MG Oral Tab Take 0.5 tablets (12.5 mg total) by mouth daily.      valsartan 320 MG Oral Tab Take 1 tablet (320 mg total) by mouth daily.         Problem List:  Patient Active Problem List   Diagnosis    L4-5 grade 2 unstable, L3-4 grade 1 unstable spondylolisthesis    L4-5 left mild & right moderate-severe, L3-4 right mild, L5-S1 left mild foraminal stenosis    L5-S1 bilateral mild-moderate far lateral, L4-5 moderate diffuse & right moderate-large foraminal & lateral recess, L3-4 right mild-moderate formainal & mild diffuse bulging discs    L4-5 mod-severe, L3-4 mild-mod central stenosis    Chronic bilateral low back pain with right-sided sciatica    right > left L5-S1 radiculopathy    Diabetic polyneuropathy associated with type 2 diabetes mellitus (HCC)    Hand weakness bilaterally    left C5-6 chronic radiculopathy    Bilateral carpal tunnel syndrome: moderate and s/p bilateral carpal tunnel release surgery    Ulnar neuropathy of both upper extremities at the elbows: moderate    C2-3 mild central, C3-4 mod difuse, C4-5 mod-large diffuse, C5-6 right large & left mod foraminal, C6-7 mod diffuse bulging discs    C5-6 moderate central herniated disc with left caudal extrusion    C3-4 mod central & bilateral foraminal, C4-5 left severe/right mod & mod-severe central, C5-6 right severe/left mod-severe & mod-severe central, C6-7 mod central stenosis    Reactive depression    Atherosclerosis of coronary artery    Diabetes mellitus (HCC)    HTN (hypertension)    Morbid obesity (HCC)    Cervical myelopathy (HCC)    Impingement syndrome of shoulder region    Obesity with body mass index 30 or greater    Pain in joint of left shoulder    Stiffness of left knee       PMHx:  Past Medical History:    Back problem    Diabetes (HCC)     Diabetes mellitus (HCC)    Difficult intubation    was informed that a \"glide\" scope should be used    Disorder of thyroid    Benign cyst to left thyroid    Esophageal reflux    Essential hypertension    High blood pressure    High cholesterol    HTN (hypertension)    Hx of motion sickness    Hypercholesterolemia    Neuropathy    Feet bilaterally    Osteoarthritis    PONV (postoperative nausea and vomiting)    Visual impairment    glasses/contacts       PSHx:  Past Surgical History:   Procedure Laterality Date          Carpal tunnel release Bilateral     Knee replacement surgery Left     Lap adjustable gastric band      Laparoscopic cholecystectomy      Other surgical history  2023    LUMBAR 4 AND LUMBAR 5 LAMINECTOMIES WITH MEDIAL FACETECTOMIES    Other surgical history  2023    CERVICAL 4, CERVICAL 5, CERVICAL 6, PARTIAL CERVICAL 3 AND PARTIAL CERVICAL  7 LAMINECTOMIES, CERVICAL 4-CERVICAL 5, CERVICAL 5-CERVICAL 6 POSTERIOR SPINAL FUSION    Rotator cuff repair Bilateral        SocHx:  Social History     Socioeconomic History    Marital status:    Tobacco Use    Smoking status: Former     Types: Cigarettes    Smokeless tobacco: Never    Tobacco comments:     Quit 50 years ago, smoker for 1 year    Vaping Use    Vaping status: Never Used   Substance and Sexual Activity    Alcohol use: Not Currently    Drug use: Never   Other Topics Concern    Caffeine Concern No       Family History:  Family History   Problem Relation Age of Onset    High Blood Pressure Mother     High Blood Pressure Father            ROS:  10 point ROS completed and was negative, except for pertinent positive and negatives stated in subjective.    Objective/Physical Exam:    Vital Signs:  There were no vitals taken for this visit.    Gen: Awake and in no apparent distress  HEENT: moist mucus membranes  Neck: Supple  Cardiovascular: Regular rate and rhythm, no murmur  Pulm: CTAB  GI: non-tender, normal bowel sounds  Skin:  normal, dry  Extremities: No clubbing or cyanosis      Neurologic:   MENTAL STATUS: alert, ox3, normal attention, language and fund of knowledge.      CRANIAL NERVES II to XII: PERRLA, no ptosis or diplopia, EOM intact, facial sensation intact, strong eye closure, face is symmetric, no dysarthria, tongue midline,  no tongue fasciculations or atrophy, strong shoulder shrug.    MOTOR EXAMINATION: normal tone, no fasciculations, normal strength throughout in UEs and LEs     SENSORY EXAMINATION:  UE: intact to light touch, pinprick intact  LE: intact to light touch, pinprick intact    COORDINATION:  No dysmetria, or intention tremors     REFLEXES: 2+ at biceps, 2+ brachioradialis, 2+ at patella, 2+ at the ankles     GAIT: normal stance, normal gait             Labs:       Imaging:  MRI C spine 5/30/2023  CONCLUSION:   1. Multilevel degenerative changes of the cervical spine, with severe spinal canal narrowing at C5-C6, moderate to severe spinal canal stenosis at C4-C5, and moderate spinal canal narrowing at C3-C4 and C6-C7.      2. Multilevel foraminal impingement as detailed above.      3. No abnormal intrinsic cord signal intensity is detected.      4. No acute osseous injury of the cervical spine is detected.      5. Lesser incidental findings as above.     EMG/NCS    Conclusion:  This is an abnormal study.  There is   electrodiagnostic evidence of   1. Right moderate medial neuropathy at the wrist which is   consistent with right moderate carpal tunnel syndrome.   2. Left moderate median neuropathy at the wrist which is   consistent with left moderate carpal tunnel syndrome.   3. Bilateral moderate ulnar neuropathies at the level of the   elbows.   4. Left C5-6 chronic radiculopathy.   5. There is no brachial plexopathy.   6. There is no peripheral neuropathy.   7. There is no myopathy.     Assessment:  This is a 70 y/o female with reports of whole body tremor when she stopped gabapentin. She was aking this for her  cervical pain. Her exam is unremarkable today./ She has a very mild fine tremor in the fingers She does report that she has RLS. I am not certain what would have been causing these tremors that she tells me about today. I do not see anything on exam but she tells me that she feels like she is shaking ab bit internally. Bakari this was some exacerbation of RLS. She states that her symptoms resolved mostly when she resumed gabapentin. She states that she feels tired on gabapentin even at the low dose of 10mg TID that she is taking. I did discuss taking 300mg nightly to reduce daytime somnolence which she states she may try. I can get an MRI of the brain to jon for nay intracranial lesions that would have caused this whole body weakness and tremoring. I doubt seizures and did discuss EEG today which we can consider if her symptoms return. She has no signs of parkinsonism on exam      Plan:  tremors  - MRI Brain   - Can continue gabapentin      Kamlesh Bucio, DO  Neurology

## 2024-06-11 ENCOUNTER — HOSPITAL ENCOUNTER (OUTPATIENT)
Dept: MRI IMAGING | Age: 69
Discharge: HOME | End: 2024-06-11
Attending: Other
Payer: MEDICARE

## 2024-06-11 DIAGNOSIS — R25.1 TREMOR: ICD-10-CM

## 2024-06-11 PROCEDURE — 70551 MRI BRAIN STEM W/O DYE: CPT | Performed by: OTHER

## 2024-09-09 ENCOUNTER — HOSPITAL ENCOUNTER (OUTPATIENT)
Dept: GENERAL RADIOLOGY | Age: 69
Discharge: HOME | End: 2024-09-09
Attending: NEUROLOGICAL SURGERY
Payer: MEDICARE

## 2024-09-09 DIAGNOSIS — Z98.1 S/P CERVICAL SPINAL FUSION: ICD-10-CM

## 2024-09-09 DIAGNOSIS — G95.9 CERVICAL MYELOPATHY (HCC): ICD-10-CM

## 2024-09-09 PROCEDURE — 72040 X-RAY EXAM NECK SPINE 2-3 VW: CPT | Performed by: NEUROLOGICAL SURGERY

## 2024-09-12 ENCOUNTER — OFFICE VISIT (OUTPATIENT)
Dept: SURGERY | Facility: CLINIC | Age: 69
End: 2024-09-12
Payer: MEDICARE

## 2024-09-12 VITALS
HEIGHT: 67 IN | BODY MASS INDEX: 33.9 KG/M2 | SYSTOLIC BLOOD PRESSURE: 118 MMHG | HEART RATE: 80 BPM | WEIGHT: 216 LBS | DIASTOLIC BLOOD PRESSURE: 70 MMHG

## 2024-09-12 DIAGNOSIS — G95.9 CERVICAL MYELOPATHY (HCC): Primary | ICD-10-CM

## 2024-09-12 DIAGNOSIS — Z98.1 S/P CERVICAL SPINAL FUSION: ICD-10-CM

## 2024-09-12 DIAGNOSIS — Z98.890 S/P LAMINECTOMY: ICD-10-CM

## 2024-09-12 PROCEDURE — 99212 OFFICE O/P EST SF 10 MIN: CPT | Performed by: NEUROLOGICAL SURGERY

## 2024-09-12 NOTE — PATIENT INSTRUCTIONS
Refill policies:    Allow 2-3 business days for refills; controlled substances may take longer.  Contact your pharmacy at least 5 days prior to running out of medication and have them send an electronic request or submit request through the “request refill” option in your [employer] account.  Refills are not addressed on weekends; covering physicians do not authorize routine medications on weekends.  No narcotics or controlled substances are refilled after noon on Fridays or by on call physicians.  By law, narcotics must be electronically prescribed.  A 30 day supply with no refills is the maximum allowed.  If your prescription is due for a refill, you may be due for a follow up appointment.  To best provide you care, patients receiving routine medications need to be seen at least once a year.  Patients receiving narcotic/controlled substance medications need to be seen at least once every 3 months.  In the event that your preferred pharmacy does not have the requested medication in stock (e.g. Backordered), it is your responsibility to find another pharmacy that has the requested medication available.  We will gladly send a new prescription to that pharmacy at your request.    Scheduling Tests:    If your physician has ordered radiology tests such as MRI or CT scans, please contact Central Scheduling at [phone] right away to schedule the test.  Once scheduled, the [facility] Centralized Referral Team will work with your insurance carrier to obtain pre-certification or prior authorization.  Depending on your insurance carrier, approval may take 3-10 days.  It is highly recommended patients assure they have received an authorization before having a test performed.  If test is done without insurance authorization, patient may be responsible for the entire amount billed.      Precertification and Prior Authorizations:  If your physician has recommended that you have a procedure or additional testing performed the [facility]  Centralized Referral Team will contact your insurance carrier to obtain pre-certification or prior authorization.    You are strongly encouraged to contact your insurance carrier to verify that your procedure/test has been approved and is a COVERED benefit.  Although the [facility] Centralized Referral Team does its due diligence, the insurance carrier gives the disclaimer that \"Although the procedure is authorized, this does not guarantee payment.\"    Ultimately the patient is responsible for payment.   Thank you for your understanding in this matter.  Paperwork Completion:  If you require FMLA or disability paperwork for your recovery, please make sure to either drop it off or have it faxed to our office at [phone]. Be sure the form has your name and date of birth on it.  The form will be faxed to our Forms Department and they will complete it for you.  There is a 25$ fee for all forms that need to be filled out.  Please be aware there is a 10-14 day turnaround time.  You will need to sign a release of information (NIKA) form if your paperwork does not come with one.  You may call the Forms Department with any questions at [phone].  Their fax number is [phone].

## 2024-09-12 NOTE — PROGRESS NOTES
Established patient:  Reason for follow up:   1 year post op, sx 09/06/23    Numeric Rating Scale:         Pain at Present: 1/10       New imaging or testing since your last office visit:    XR cervical spine DOS 09/09/24
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
## (undated) DEVICE — COVER,TABLE,44X90,STERILE: Brand: MEDLINE

## (undated) DEVICE — ALCOHOL 70% 4 OZ

## (undated) DEVICE — CATH IV 14G X 5-1/4 ANGIO

## (undated) DEVICE — MAYFIELD® DISPOSABLE ADULT SKULL PIN (PLASTIC BASE): Brand: MAYFIELD®

## (undated) DEVICE — SUT VICRYL 0 CT-1 J470D

## (undated) DEVICE — Device: Brand: INTELLICART™

## (undated) DEVICE — SUT ETHILON 3-0 PS-2 1669H

## (undated) DEVICE — SLEEVE KENDALL SCD EXPRESS MED

## (undated) DEVICE — SUT VICRYL 2-0 CT-2 J726D

## (undated) DEVICE — CURAD NONADHERANT PAD 3X8

## (undated) DEVICE — SUT VICRYL 0 CT-1 J740D

## (undated) DEVICE — STERILE POLYISOPRENE POWDER-FREE SURGICAL GLOVES: Brand: PROTEXIS

## (undated) DEVICE — SOL NACL IRRIG 0.9% 1000ML BTL

## (undated) DEVICE — DRAPE,LAPAROTOMY,PCH,STERILE: Brand: MEDLINE

## (undated) DEVICE — PEN SKIN MARKING REG TIP VIOLT

## (undated) DEVICE — LAMINECTOMY CDS: Brand: MEDLINE INDUSTRIES, INC.

## (undated) DEVICE — Device

## (undated) DEVICE — SUT ETHILON 3-0 FSL 1671H

## (undated) DEVICE — 3.0MM PRECISION NEURO (MATCH HEAD)

## (undated) DEVICE — GAUZE, BORDER, 3"X6", 1.5"X4"PAD, STERIL: Brand: MEDLINE INDUSTRIES, INC.

## (undated) DEVICE — LIGHT HANDLE

## (undated) DEVICE — C-ARM: Brand: UNBRANDED

## (undated) DEVICE — GOWN AERO CHROME XXL

## (undated) DEVICE — STERILE SYNTHETIC POLYISOPRENE POWDER-FREE SURGICAL GLOVES WITH HYDROGEL COATING: Brand: PROTEXIS

## (undated) DEVICE — DRILL: Brand: OASYS

## (undated) DEVICE — NON-ADHERENT PAD PREPACK: Brand: TELFA

## (undated) DEVICE — PROXIMATE SKIN STAPLERS (35 WIDE) CONTAINS 35 STAINLESS STEEL STAPLES (FIXED HEAD): Brand: PROXIMATE

## (undated) DEVICE — VIOLET BRAIDED (POLYGLACTIN 910), SYNTHETIC ABSORBABLE SUTURE: Brand: COATED VICRYL

## (undated) DEVICE — GAUZE TRAY STERILE 4X4 12PLY

## (undated) DEVICE — CLOSURE EXOFIN 1.0ML

## (undated) NOTE — LETTER
7/11/2022      Claude Holden MD  Physical Medicine and Rehabilitation  [facility], [address] [place] [address]  Dept: [phone]  Dept Fax: [phone]        RE: Consultation for Frances Mendez        Dear Cindi Agarwal MD,    Thank you very much for the opportunity to see your patient. Attached please find a summary from your patient's recent visit. I appreciate the chance to take care of your patient with you. Please feel free to call me with any questions or concerns. Sincerely,        Clint Monahan.  Kailash Holden MD  Electronically Signed on 7/11/2022

## (undated) NOTE — LETTER
10/27/2022      Patient Pat Garcia  : 1955      Dear Dr. Jeffry Odonnell,    This letter is to inform you that your patient is being scheduled for surgery with Dr. Calvin Gomez on 2023 at [facility]. We have asked the patient to contact your office to schedule an appointment for cardiac clearance. Diagnosis: Lumbar stenosis with neurogenic claudication M48.062                     Spondylolisthesis at L4-L5 level M43.16  Procedure: L4-L5 laminectomy with medial facetectomies    A Pre-operative History & Physical is needed for cardiac clearance. Please address patient's active problems (i.e high blood pressure, breathing issues etc.) Please assist in determining if it is safe for the patient to undergo anesthesia for surgery. Pre-op labs will be done through the [facility] or through the PCP office. Please fax clearance letter/office visit note to our office at fax #: [phone] with determination of clearance status. If you have any questions, you may contact our office at [phone], option # 3.     Thank you,      MUNIRA Staff

## (undated) NOTE — LETTER
OUTSIDE TESTING RESULT REQUEST     IMPORTANT: FOR YOUR IMMEDIATE ATTENTION  Please FAX all test results listed below to: [phone]     Testing already done on or about: 23     * * * * If testing is NOT complete, arrange with patient A.S.A.P. * * * *    Patient Name: Kasia Sotomayor  Surgery Date: 2023  Medical Record: [id number]              CSN: [id number]  : 1955 - A: 77 y     Sex: female  Surgeon(s):  Monroe Silva MD  Procedure: CERVICAL 4, CERVICAL 5, CERVICAL 6, PARTIAL 3 AND PARTIAL 7 LAMINECTOMIES, CERVICAL 4/ CERVICAL 5, CERVICAL 5/ CERVICAL 6 POSTERIOR SPINAL FUSION WITH INSTRUMENTATION, ALLOGRAFT, AUTOGRAFT AND ALL INDICATED PROCEDURES  Anesthesia Type: General     Surgeon: Monroe Silva MD     The following Testing and Time Line are REQUIRED PER ANESTHESIA     EKG READ AND SIGNED WITHIN   90 days  Chest X-Ray within 6 months  CBC [with Differential & Platelets] within  90 days  CMP (requires 4 hour fast) within  90 days  PT/INR within  30 days  PTT within  30 days  MSSA/MRSA Nasal screening within 30 days      Thank You,   Sent by: Holland Forrest

## (undated) NOTE — LETTER
RIKI Notifier: [address]  B. Patient Name: Finn Curry. Identification Number: [id number]    Advance Beneficiary Notice of Non-coverage (ABN)  NOTE: If Medicare doesn't pay for D. item/service(s) below, you may have to pay. Medicare does not pay for everything, even some care that you or your health care provider have good reason to think you need. We expect Medicare may not pay for the D. item/service(s) below. Uday Hugo Reason Medicare May Not Pay: F. Estimated Cost   ___Kenalog with Lidocaine and Marcaine   $320  ___Betamethasone with Lidocaine and/or Marcaine    $48  ___ Kenalog, Ketorolac, with Lidocaine and/or Marcaine   $370  ___Durolane      $0116  ___Euflexxa          $804  ___Hyalgan/Supartz  $7495  ___Orthovisc         $487  ___Synvisc   $1104  ___Synvisc One    $368  ___Monovisc         $1088  ___Zilretta        $856  ___Injection intra-articular $260  __  Medicare does not cover this service      __  Medicare may not pay for this   item/service for your condition     __  Medicare may not pay for this item/service as often as this          WHAT YOU NEED TO DO NOW:  Read this notice, so you can make an informed decision about your care. Ask us any questions that you may have after you finish reading. Choose an option below about whether to receive the D. item/service(s) listed above. Note: If you choose Option 1 or 2, we may help you to use any other insurance that you might have, but Medicare cannot require us to do this. G. OPTIONS: Check only one box. We cannot choose a box for you. OPTION 1. I want the D. item/service(s) listed above. You may ask to be paid now, but I also want Medicare billed for an official decision on payment, which is sent to me on a Medicare Summary Notice (MSN). I understand that if Medicare doesn't pay, I am responsible for payment, but I can appeal to Medicare by following the directions on the MSN.  If Medicare does pay, you will refund any payments I made to you, less co-pays or deductibles. OPTION 2. I want the D. item/service(s) listed above, but do not bill Medicare. You may ask to be paid now as I am responsible for payment. I cannot appeal if Medicare is not billed. OPTION 3. I don't want the D. item/service(s) listed above. I understand with this choice I am not responsible for payment, and I cannot appeal to see if Medicare would pay. H. Additional Information: This notice gives our opinion, not an official Medicare decision. If you have other questions on this notice or Medicare billing, call 1-800-MEDICARE (1-615.696.1076/LBR: [phone]). Signing below means that you have received and understand this notice. You may ask to receive a copy. I. Signature: J. Date:   You have the right to get Medicare information in an accessible format, like large print, Braille, or audio. You also have the right to file a complaint if you feel you've been discriminated against. Visit Medicare.gov/about- us/hywcdtczgfldu-jpqrtjpkkldrypwiv-rcxcrx. According to the Paperwork Reduction Act of 1995, no persons are required to respond to a collection of information unless it displays a valid OMB control number. The valid OMB control number for this information collection is 4552-2029. The time required to complete this information collection is estimated to average 7 minutes per response, including the time to review instructions, search existing data resources, gather the data needed, and complete and review the information collection. If you have comments concerning the accuracy of the time estimate or suggestions for improving this form, please write to: CMS, [address], Attn: PRA [address] [place] [address].     Form CMS-R-131 (Exp.01/31/2026) Form Approved B No. [id number]

## (undated) NOTE — LETTER
OUTSIDE TESTING RESULT REQUEST     IMPORTANT: FOR YOUR IMMEDIATE ATTENTION  Please FAX all test results listed below to: [phone]     Testing already done on or about: Appointment 2022      * * * * If testing is NOT complete, arrange with patient A.S.A.P. * * * *      Patient Name: Rozina Soria  Surgery Date: 2023  CSN: [id number]  Medical Record: [id number]   : 1955 - A: 69 y      Sex: female  Surgeon(s):  Josh Sampson MD  Procedure: LUMBAR 4 AND LUMBAR 5 LAMINECTOMIES WITH MEDIAL FACETECTOMIES AND ALL INDICATED PROCEDURES  Anesthesia Type: General     Surgeon: Josh Sampson MD     The following Testing and Time Line are REQUIRED PER ANESTHESIA     CBC [with Differential & Platelets] within  90 days  BMP (requires 4 hour fast) within  90 days  PT/INR within  30 days  PTT within  30 days  MSSA/MRSA Nasal screening within 30 days      Thank You,   Sent by: Jemima Fernández RN

## (undated) NOTE — LETTER
OUTSIDE TESTING RESULT REQUEST     IMPORTANT: FOR YOUR IMMEDIATE ATTENTION  Please FAX all test results listed below to: [phone]     Testing already done on or about: Today 2022     * * * * If testing is NOT complete, arrange with patient A.S.A.P. * * * *      Patient Name: Elif Sethi  Surgery Date: 2023  CSN: [id number]  Medical Record: [id number]   : 1955 - A: 69 y      Sex: female  Surgeon(s):  Nathan Vigil MD  Procedure: LUMBAR 4 AND LUMBAR 5 LAMINECTOMIES WITH MEDIAL FACETECTOMIES AND ALL INDICATED PROCEDURES  Anesthesia Type: General     Surgeon: Nathan Vigil MD     The following Testing and Time Line are REQUIRED PER ANESTHESIA     EKG READ AND SIGNED WITHIN   90 days      Thank You,   Sent by: Joni Longoria RN